# Patient Record
Sex: FEMALE | Race: OTHER | HISPANIC OR LATINO
[De-identification: names, ages, dates, MRNs, and addresses within clinical notes are randomized per-mention and may not be internally consistent; named-entity substitution may affect disease eponyms.]

---

## 2022-08-01 PROBLEM — Z00.00 ENCOUNTER FOR PREVENTIVE HEALTH EXAMINATION: Status: ACTIVE | Noted: 2022-08-01

## 2022-08-26 PROBLEM — N18.5 ANEMIA DUE TO STAGE 5 CHRONIC KIDNEY DISEASE, NOT ON CHRONIC DIALYSIS: Status: RESOLVED | Noted: 2022-08-26 | Resolved: 2022-08-26

## 2022-08-29 ENCOUNTER — APPOINTMENT (OUTPATIENT)
Dept: CARDIOLOGY | Facility: CLINIC | Age: 77
End: 2022-08-29

## 2022-08-29 ENCOUNTER — NON-APPOINTMENT (OUTPATIENT)
Age: 77
End: 2022-08-29

## 2022-08-29 VITALS
TEMPERATURE: 98.3 F | HEART RATE: 81 BPM | DIASTOLIC BLOOD PRESSURE: 66 MMHG | SYSTOLIC BLOOD PRESSURE: 138 MMHG | RESPIRATION RATE: 16 BRPM | HEIGHT: 59 IN | BODY MASS INDEX: 25.8 KG/M2 | WEIGHT: 128 LBS | OXYGEN SATURATION: 100 %

## 2022-08-29 DIAGNOSIS — N18.5 CHRONIC KIDNEY DISEASE, STAGE 5: ICD-10-CM

## 2022-08-29 DIAGNOSIS — Z82.49 FAMILY HISTORY OF ISCHEMIC HEART DISEASE AND OTHER DISEASES OF THE CIRCULATORY SYSTEM: ICD-10-CM

## 2022-08-29 DIAGNOSIS — Z80.9 FAMILY HISTORY OF MALIGNANT NEOPLASM, UNSPECIFIED: ICD-10-CM

## 2022-08-29 DIAGNOSIS — Z78.9 OTHER SPECIFIED HEALTH STATUS: ICD-10-CM

## 2022-08-29 DIAGNOSIS — M19.90 UNSPECIFIED OSTEOARTHRITIS, UNSPECIFIED SITE: ICD-10-CM

## 2022-08-29 DIAGNOSIS — D63.1 CHRONIC KIDNEY DISEASE, STAGE 5: ICD-10-CM

## 2022-08-29 DIAGNOSIS — Z83.3 FAMILY HISTORY OF DIABETES MELLITUS: ICD-10-CM

## 2022-08-29 PROCEDURE — 93000 ELECTROCARDIOGRAM COMPLETE: CPT

## 2022-08-29 PROCEDURE — 99204 OFFICE O/P NEW MOD 45 MIN: CPT | Mod: 25
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
## 2022-08-29 NOTE — REVIEW OF SYSTEMS
[Feeling Fatigued] : feeling fatigued [Dyspnea on exertion] : dyspnea during exertion [Cough] : cough [Negative] : Heme/Lymph [Fever] : no fever [Chills] : no chills [SOB] : no shortness of breath [Chest Discomfort] : no chest discomfort [Lower Ext Edema] : no extremity edema [Leg Claudication] : no intermittent leg claudication [Palpitations] : no palpitations [Orthopnea] : no orthopnea [PND] : no PND [Syncope] : no syncope [Wheezing] : no wheezing [Coughing Up Blood] : no hemoptysis

## 2022-08-29 NOTE — PHYSICAL EXAM
[Well Developed] : well developed [Well Nourished] : well nourished [No Acute Distress] : no acute distress [Normal Conjunctiva] : normal conjunctiva [Normal Venous Pressure] : normal venous pressure [No Carotid Bruit] : no carotid bruit [Normal S1, S2] : normal S1, S2 [No Rub] : no rub [No Gallop] : no gallop [Clear Lung Fields] : clear lung fields [Good Air Entry] : good air entry [No Respiratory Distress] : no respiratory distress  [Soft] : abdomen soft [Non Tender] : non-tender [No Masses/organomegaly] : no masses/organomegaly [Normal Bowel Sounds] : normal bowel sounds [Normal Gait] : normal gait [No Edema] : no edema [No Cyanosis] : no cyanosis [No Clubbing] : no clubbing [No Varicosities] : no varicosities [No Rash] : no rash [No Skin Lesions] : no skin lesions [Moves all extremities] : moves all extremities [No Focal Deficits] : no focal deficits [Normal Speech] : normal speech [Alert and Oriented] : alert and oriented [Normal memory] : normal memory [de-identified] : soft systolic murmur at LUSB

## 2022-08-30 LAB
ALBUMIN SERPL ELPH-MCNC: 4.2 G/DL
ALP BLD-CCNC: 138 U/L
ALT SERPL-CCNC: 28 U/L
ANION GAP SERPL CALC-SCNC: 13 MMOL/L
AST SERPL-CCNC: 35 U/L
BASOPHILS # BLD AUTO: 0.05 K/UL
BASOPHILS NFR BLD AUTO: 1.3 %
BILIRUB SERPL-MCNC: 0.2 MG/DL
BUN SERPL-MCNC: 20 MG/DL
CALCIUM SERPL-MCNC: 9.7 MG/DL
CHLORIDE SERPL-SCNC: 93 MMOL/L
CHOLEST SERPL-MCNC: 155 MG/DL
CO2 SERPL-SCNC: 23 MMOL/L
CREAT SERPL-MCNC: 1.04 MG/DL
EGFR: 55 ML/MIN/1.73M2
EOSINOPHIL # BLD AUTO: 0.11 K/UL
EOSINOPHIL NFR BLD AUTO: 2.8 %
ESTIMATED AVERAGE GLUCOSE: 100 MG/DL
GLUCOSE SERPL-MCNC: 57 MG/DL
HBA1C MFR BLD HPLC: 5.1 %
HCT VFR BLD CALC: 27.6 %
HDLC SERPL-MCNC: 50 MG/DL
HGB BLD-MCNC: 8.6 G/DL
IMM GRANULOCYTES NFR BLD AUTO: 0.3 %
LDLC SERPL CALC-MCNC: 83 MG/DL
LYMPHOCYTES # BLD AUTO: 1 K/UL
LYMPHOCYTES NFR BLD AUTO: 25.1 %
MAN DIFF?: NORMAL
MCHC RBC-ENTMCNC: 29.6 PG
MCHC RBC-ENTMCNC: 31.2 GM/DL
MCV RBC AUTO: 94.8 FL
MONOCYTES # BLD AUTO: 0.39 K/UL
MONOCYTES NFR BLD AUTO: 9.8 %
NEUTROPHILS # BLD AUTO: 2.42 K/UL
NEUTROPHILS NFR BLD AUTO: 60.7 %
NONHDLC SERPL-MCNC: 105 MG/DL
PLATELET # BLD AUTO: 273 K/UL
POTASSIUM SERPL-SCNC: 3.9 MMOL/L
PROT SERPL-MCNC: 8.1 G/DL
RBC # BLD: 2.91 M/UL
RBC # FLD: 13.4 %
SODIUM SERPL-SCNC: 130 MMOL/L
TRIGL SERPL-MCNC: 109 MG/DL
TSH SERPL-ACNC: 3.18 UIU/ML
WBC # FLD AUTO: 3.98 K/UL

## 2022-12-29 ENCOUNTER — APPOINTMENT (OUTPATIENT)
Dept: RHEUMATOLOGY | Facility: CLINIC | Age: 77
End: 2022-12-29
Payer: MEDICARE

## 2022-12-29 ENCOUNTER — LABORATORY RESULT (OUTPATIENT)
Age: 77
End: 2022-12-29

## 2022-12-29 ENCOUNTER — RESULT REVIEW (OUTPATIENT)
Age: 77
End: 2022-12-29

## 2022-12-29 VITALS
OXYGEN SATURATION: 96 % | SYSTOLIC BLOOD PRESSURE: 148 MMHG | HEART RATE: 82 BPM | HEIGHT: 59 IN | DIASTOLIC BLOOD PRESSURE: 72 MMHG | WEIGHT: 121 LBS | TEMPERATURE: 97.9 F | BODY MASS INDEX: 24.39 KG/M2

## 2022-12-29 PROCEDURE — 99205 OFFICE O/P NEW HI 60 MIN: CPT

## 2022-12-29 PROCEDURE — 99215 OFFICE O/P EST HI 40 MIN: CPT

## 2023-01-02 NOTE — REVIEW OF SYSTEMS
[As Noted in HPI] : as noted in HPI [Arthralgias] : arthralgias [Joint Swelling] : joint swelling [Joint Stiffness] : joint stiffness [Negative] : Heme/Lymph

## 2023-01-02 NOTE — HISTORY OF PRESENT ILLNESS
[Currently Experiencing] : currently [Arthralgias] : arthralgias [FreeTextEntry1] : patient with a hx of RA for many years - with ongoing deformities over the hands.  reports mild pain - minimal stiffness and swelling \par Diagnosed with  giant cell arteritis in 2019 with loss of vision  over the right eye - was on prednisone for 3 years, now tapered off over 3 months with no return of her symptoms. \par \par was on tocilizumab but had a heart attack in 02/2021 - and stopped - unsure if related - now with heart failure\par vision is decreased - has not seen ophto - in over 2 years.\par ongoing anemia - last CBC in 08/2022 at 8.6 - no iron levels\par has gastro appt tomorrow\par \par has ongoing bone pain - has stiffness in the morning -

## 2023-01-02 NOTE — ASSESSMENT
[FreeTextEntry1] : \par \par 1. RA for many years with multiple hand deformities - ongoing anemia of chronic disease - check labs today - previously on tocilizumab send for x-rays of the hands - check full rheum serologies\par 2. GCA - had been on prednisone for 3 years - has stopped prednisone for about 3 months with no return of her symptoms.  has decreased vision but has not seen optho - referral to ophto\par 3. CAD with CHF\par 4. osteoporosis - send for dexa - has lost 4 inches send for x-rays ro fracture\par 5. knee pain - send for x-rays\par \par patient to call in 1 week to discuss results and next steps\par \par \par f/u 6 weeks\par \par \par

## 2023-01-02 NOTE — PHYSICAL EXAM
[General Appearance - Alert] : alert [General Appearance - In No Acute Distress] : in no acute distress [Neck Appearance] : the appearance of the neck was normal [Neck Cervical Mass (___cm)] : no neck mass was observed [Jugular Venous Distention Increased] : there was no jugular-venous distention [Thyroid Diffuse Enlargement] : the thyroid was not enlarged [Thyroid Nodule] : there were no palpable thyroid nodules [Auscultation Breath Sounds / Voice Sounds] : lungs were clear to auscultation bilaterally [Heart Rate And Rhythm] : heart rate was normal and rhythm regular [Heart Sounds] : normal S1 and S2 [Heart Sounds Gallop] : no gallops [Murmurs] : no murmurs [Heart Sounds Pericardial Friction Rub] : no pericardial rub [Full Pulse] : the pedal pulses are present [Edema] : there was no peripheral edema [Abdomen Soft] : soft [Bowel Sounds] : normal bowel sounds [Abdomen Tenderness] : non-tender [Abdomen Mass (___ Cm)] : no abdominal mass palpated [Cervical Lymph Nodes Enlarged Posterior Bilaterally] : posterior cervical [Cervical Lymph Nodes Enlarged Anterior Bilaterally] : anterior cervical [Supraclavicular Lymph Nodes Enlarged Bilaterally] : supraclavicular [No CVA Tenderness] : no ~M costovertebral angle tenderness [No Spinal Tenderness] : no spinal tenderness [FreeTextEntry1] : ra changes over the hands,with ulnar deviation and swan neck deformities - shoulder with decreased ROM and  synovitis over the shoulder - all other joints with full ROM [Skin Color & Pigmentation] : normal skin color and pigmentation [Skin Turgor] : normal skin turgor [] : no rash [Oriented To Time, Place, And Person] : oriented to person, place, and time [Impaired Insight] : insight and judgment were intact [Affect] : the affect was normal

## 2023-01-09 ENCOUNTER — NON-APPOINTMENT (OUTPATIENT)
Age: 78
End: 2023-01-09

## 2023-01-09 ENCOUNTER — APPOINTMENT (OUTPATIENT)
Dept: CARDIOLOGY | Facility: CLINIC | Age: 78
End: 2023-01-09
Payer: MEDICARE

## 2023-01-09 ENCOUNTER — APPOINTMENT (OUTPATIENT)
Dept: CARDIOLOGY | Facility: CLINIC | Age: 78
End: 2023-01-09

## 2023-01-09 VITALS
WEIGHT: 125 LBS | DIASTOLIC BLOOD PRESSURE: 81 MMHG | OXYGEN SATURATION: 98 % | HEIGHT: 59 IN | SYSTOLIC BLOOD PRESSURE: 155 MMHG | BODY MASS INDEX: 25.2 KG/M2 | HEART RATE: 83 BPM

## 2023-01-09 PROCEDURE — 99214 OFFICE O/P EST MOD 30 MIN: CPT | Mod: 25

## 2023-01-09 PROCEDURE — 93000 ELECTROCARDIOGRAM COMPLETE: CPT

## 2023-01-09 NOTE — PHYSICAL EXAM
[Well Developed] : well developed [Well Nourished] : well nourished [No Acute Distress] : no acute distress [Normal Conjunctiva] : normal conjunctiva [Normal Venous Pressure] : normal venous pressure [No Carotid Bruit] : no carotid bruit [Normal S1, S2] : normal S1, S2 [No Rub] : no rub [No Gallop] : no gallop [Clear Lung Fields] : clear lung fields [Good Air Entry] : good air entry [No Respiratory Distress] : no respiratory distress  [Soft] : abdomen soft [Non Tender] : non-tender [No Masses/organomegaly] : no masses/organomegaly [Normal Bowel Sounds] : normal bowel sounds [Normal Gait] : normal gait [No Edema] : no edema [No Cyanosis] : no cyanosis [No Clubbing] : no clubbing [No Varicosities] : no varicosities [No Rash] : no rash [No Skin Lesions] : no skin lesions [Moves all extremities] : moves all extremities [No Focal Deficits] : no focal deficits [Normal Speech] : normal speech [Alert and Oriented] : alert and oriented [Normal memory] : normal memory [de-identified] : soft systolic murmur at LUSB
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
## 2023-01-12 LAB
ALBUMIN MFR SERPL ELPH: 52.4 %
ALBUMIN SERPL ELPH-MCNC: 4.6 G/DL
ALBUMIN SERPL-MCNC: 4 G/DL
ALBUMIN/GLOB SERPL: 1.1 RATIO
ALP BLD-CCNC: 112 U/L
ALPHA1 GLOB MFR SERPL ELPH: 4.8 %
ALPHA1 GLOB SERPL ELPH-MCNC: 0.4 G/DL
ALPHA2 GLOB MFR SERPL ELPH: 11.7 %
ALPHA2 GLOB SERPL ELPH-MCNC: 0.9 G/DL
ALT SERPL-CCNC: 34 U/L
ANION GAP SERPL CALC-SCNC: 13 MMOL/L
APPEARANCE: CLEAR
AST SERPL-CCNC: 35 U/L
B-GLOBULIN MFR SERPL ELPH: 11.6 %
B-GLOBULIN SERPL ELPH-MCNC: 0.9 G/DL
BASOPHILS # BLD AUTO: 0.04 K/UL
BASOPHILS NFR BLD AUTO: 1 %
BILIRUB SERPL-MCNC: 0.4 MG/DL
BILIRUBIN URINE: NEGATIVE
BLOOD URINE: ABNORMAL
BUN SERPL-MCNC: 24 MG/DL
CALCIUM SERPL-MCNC: 9.7 MG/DL
CCP AB SER IA-ACNC: <8 UNITS
CHLORIDE SERPL-SCNC: 96 MMOL/L
CO2 SERPL-SCNC: 25 MMOL/L
COLOR: NORMAL
CREAT SERPL-MCNC: 1.11 MG/DL
CRP SERPL-MCNC: <3 MG/L
DEPRECATED KAPPA LC FREE/LAMBDA SER: 2.17 RATIO
DSDNA AB SER-ACNC: <12 IU/ML
EGFR: 51 ML/MIN/1.73M2
ENA RNP AB SER IA-ACNC: 0.5 AL
ENA SCL70 IGG SER IA-ACNC: <0.2 AL
ENA SM AB SER IA-ACNC: <0.2 AL
ENA SS-A AB SER IA-ACNC: <0.2 AL
ENA SS-B AB SER IA-ACNC: 0.2 AL
EOSINOPHIL # BLD AUTO: 0.13 K/UL
EOSINOPHIL NFR BLD AUTO: 3.2 %
ERYTHROCYTE [SEDIMENTATION RATE] IN BLOOD BY WESTERGREN METHOD: 76 MM/HR
FOLATE SERPL-MCNC: 15.8 NG/ML
GAMMA GLOB FLD ELPH-MCNC: 1.5 G/DL
GAMMA GLOB MFR SERPL ELPH: 19.5 %
GLUCOSE QUALITATIVE U: NEGATIVE
GLUCOSE SERPL-MCNC: 89 MG/DL
HBV CORE IGG+IGM SER QL: NONREACTIVE
HBV SURFACE AB SER QL: NONREACTIVE
HBV SURFACE AG SER QL: NONREACTIVE
HCT VFR BLD CALC: 28.5 %
HCV AB SER QL: NONREACTIVE
HCV S/CO RATIO: 0.1 S/CO
HGB BLD-MCNC: 9.4 G/DL
IGA SER QL IEP: 249 MG/DL
IGG SER QL IEP: 1593 MG/DL
IGM SER QL IEP: 18 MG/DL
IMM GRANULOCYTES NFR BLD AUTO: 0.2 %
INTERPRETATION SERPL IEP-IMP: NORMAL
IRON SATN MFR SERPL: 17 %
IRON SERPL-MCNC: 58 UG/DL
KAPPA LC CSF-MCNC: 1.98 MG/DL
KAPPA LC SERPL-MCNC: 4.3 MG/DL
KETONES URINE: NEGATIVE
LEUKOCYTE ESTERASE URINE: NEGATIVE
LYMPHOCYTES # BLD AUTO: 1.17 K/UL
LYMPHOCYTES NFR BLD AUTO: 29 %
M PROTEIN SPEC IFE-MCNC: NORMAL
M TB IFN-G BLD-IMP: NEGATIVE
MAN DIFF?: NORMAL
MCHC RBC-ENTMCNC: 29.3 PG
MCHC RBC-ENTMCNC: 33 GM/DL
MCV RBC AUTO: 88.8 FL
MONOCYTES # BLD AUTO: 0.45 K/UL
MONOCYTES NFR BLD AUTO: 11.2 %
NEUTROPHILS # BLD AUTO: 2.23 K/UL
NEUTROPHILS NFR BLD AUTO: 55.4 %
NITRITE URINE: NEGATIVE
PH URINE: 7
PLATELET # BLD AUTO: 247 K/UL
POTASSIUM SERPL-SCNC: 4.8 MMOL/L
PROT SERPL-MCNC: 7.5 G/DL
PROT SERPL-MCNC: 7.7 G/DL
PROT SERPL-MCNC: 7.7 G/DL
PROTEIN URINE: NORMAL
QUANTIFERON TB PLUS MITOGEN MINUS NIL: 5.89 IU/ML
QUANTIFERON TB PLUS NIL: 0.02 IU/ML
QUANTIFERON TB PLUS TB1 MINUS NIL: 0 IU/ML
QUANTIFERON TB PLUS TB2 MINUS NIL: 0 IU/ML
RBC # BLD: 3.21 M/UL
RBC # FLD: 14.8 %
RF+CCP IGG SER-IMP: NEGATIVE
RHEUMATOID FACT SER QL: <10 IU/ML
SODIUM SERPL-SCNC: 133 MMOL/L
SPECIFIC GRAVITY URINE: 1.02
TIBC SERPL-MCNC: 343 UG/DL
TSH SERPL-ACNC: 3.41 UIU/ML
UIBC SERPL-MCNC: 285 UG/DL
UROBILINOGEN URINE: NORMAL
VIT B12 SERPL-MCNC: 507 PG/ML
WBC # FLD AUTO: 4.03 K/UL

## 2023-01-16 ENCOUNTER — APPOINTMENT (OUTPATIENT)
Dept: RADIOLOGY | Facility: CLINIC | Age: 78
End: 2023-01-16
Payer: MEDICARE

## 2023-01-16 ENCOUNTER — OUTPATIENT (OUTPATIENT)
Dept: OUTPATIENT SERVICES | Facility: HOSPITAL | Age: 78
LOS: 1 days | End: 2023-01-16
Payer: COMMERCIAL

## 2023-01-16 ENCOUNTER — RESULT REVIEW (OUTPATIENT)
Age: 78
End: 2023-01-16

## 2023-01-16 DIAGNOSIS — M17.0 BILATERAL PRIMARY OSTEOARTHRITIS OF KNEE: ICD-10-CM

## 2023-01-16 PROCEDURE — 73564 X-RAY EXAM KNEE 4 OR MORE: CPT

## 2023-01-16 PROCEDURE — 72074 X-RAY EXAM THORAC SPINE4/>VW: CPT | Mod: 26

## 2023-01-16 PROCEDURE — 73564 X-RAY EXAM KNEE 4 OR MORE: CPT | Mod: 26,LT,RT

## 2023-01-16 PROCEDURE — 72110 X-RAY EXAM L-2 SPINE 4/>VWS: CPT | Mod: 26

## 2023-01-16 PROCEDURE — 72074 X-RAY EXAM THORAC SPINE4/>VW: CPT

## 2023-01-16 PROCEDURE — 72110 X-RAY EXAM L-2 SPINE 4/>VWS: CPT

## 2023-01-17 ENCOUNTER — OUTPATIENT (OUTPATIENT)
Dept: OUTPATIENT SERVICES | Facility: HOSPITAL | Age: 78
LOS: 1 days | End: 2023-01-17
Payer: MEDICARE

## 2023-01-17 ENCOUNTER — APPOINTMENT (OUTPATIENT)
Dept: CV DIAGNOSITCS | Facility: HOSPITAL | Age: 78
End: 2023-01-17

## 2023-01-17 DIAGNOSIS — I50.9 HEART FAILURE, UNSPECIFIED: ICD-10-CM

## 2023-01-17 PROCEDURE — 93306 TTE W/DOPPLER COMPLETE: CPT | Mod: 26

## 2023-01-27 ENCOUNTER — APPOINTMENT (OUTPATIENT)
Dept: RADIOLOGY | Facility: CLINIC | Age: 78
End: 2023-01-27
Payer: MEDICARE

## 2023-01-27 PROCEDURE — 77080 DXA BONE DENSITY AXIAL: CPT

## 2023-01-30 ENCOUNTER — APPOINTMENT (OUTPATIENT)
Dept: CARDIOTHORACIC SURGERY | Facility: CLINIC | Age: 78
End: 2023-01-30
Payer: MEDICARE

## 2023-01-30 VITALS
SYSTOLIC BLOOD PRESSURE: 143 MMHG | HEART RATE: 77 BPM | TEMPERATURE: 98.2 F | OXYGEN SATURATION: 99 % | DIASTOLIC BLOOD PRESSURE: 80 MMHG

## 2023-01-30 DIAGNOSIS — Z20.822 CONTACT WITH AND (SUSPECTED) EXPOSURE TO COVID-19: ICD-10-CM

## 2023-01-30 PROCEDURE — 99205 OFFICE O/P NEW HI 60 MIN: CPT
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
## 2023-01-31 NOTE — PHYSICAL EXAM
[Normal Rate] : normal [Rhythm Regular] : regular [Normal S1] : normal S1 [Normal S2] : normal S2 [2+] : left 2+ [Normal] : alert and oriented, normal memory [III] : a grade 3 [___ +] : bilateral [unfilled]U+ pretibial pitting edema [No Cyanosis] : no cyanosis [No Clubbing] : no clubbing [No Rash] : no rash [No Skin Lesions] : no skin lesions

## 2023-01-31 NOTE — REASON FOR VISIT
[Structural Heart and Valve Disease] : structural heart and valve disease [Family Member] : family member [FreeTextEntry3] : Dr. Manriquez

## 2023-01-31 NOTE — DISCUSSION/SUMMARY
[FreeTextEntry1] : Ms Jones has severe secondary MR with 2 HF admissions and a progressive decline in LV function since her MI in 2021; she also has severe TR and severe PHTN. Her MV leaflet anatomy does appear amenable to MV RADHA. As such, we discussed the risks and benefits of the procedure, and they would like to pursue such intervention--to whatever extent it may alleviate her symptoms, improve her functional capacity and QOL, and mitigate her risk for HF progression. That said, she does not meet the patient profile or criteria that were included in the COAPT Trial (for patients with symptomatic severe secondary MR), so those results may not necessarily be extrapolated to her case with respect to post MV RADHA expectations, which we also discussed (given her degree of biventricular dysfunction, PHTN, and concomitant severe TR). With respect to her TR, as we also discussed in detail, this would be re-evaluated after treating her MR--as per all of the TR trial protocols, repeat imaging (TTE and ISABELLA) must be performed no sooner than 60 days following a transcatheter MV intervention for re-assessment of TR severity. Additionally, all patients being evaluated for the TR trials must be under the care of a CHF team to guide GDMT optimization. As such, we have agreed to the following plan:\par \par 1.  R+L catheterization, with consideration for PCI if deemed appropriate.\par \par 2.  HF consult with Dr Garcia at [facility]. If there are no contraindications, an SGLT2i should be considered, as it is now recommended GDMT for patients with any type of HF that are being evaluated for any of the Structural Heart trials.\par \par 3.  Follow up with our team after the above is completed to discuss next steps. I anticipate we will proceed with MV RADHA once she is deemed medically optimized. As noted above, her TR would be re-evaluated and, if deemed appropriate, considered for intervention at a later date.

## 2023-02-02 ENCOUNTER — NON-APPOINTMENT (OUTPATIENT)
Age: 78
End: 2023-02-02

## 2023-02-02 ENCOUNTER — APPOINTMENT (OUTPATIENT)
Dept: CARDIOLOGY | Facility: CLINIC | Age: 78
End: 2023-02-02
Payer: MEDICARE

## 2023-02-02 VITALS
DIASTOLIC BLOOD PRESSURE: 69 MMHG | HEART RATE: 84 BPM | WEIGHT: 125 LBS | HEIGHT: 59 IN | SYSTOLIC BLOOD PRESSURE: 147 MMHG | BODY MASS INDEX: 25.2 KG/M2 | OXYGEN SATURATION: 99 %

## 2023-02-02 DIAGNOSIS — Z82.3 FAMILY HISTORY OF STROKE: ICD-10-CM

## 2023-02-02 DIAGNOSIS — R06.02 SHORTNESS OF BREATH: ICD-10-CM

## 2023-02-02 DIAGNOSIS — H54.7 UNSPECIFIED VISUAL LOSS: ICD-10-CM

## 2023-02-02 PROCEDURE — 36415 COLL VENOUS BLD VENIPUNCTURE: CPT

## 2023-02-02 PROCEDURE — 93000 ELECTROCARDIOGRAM COMPLETE: CPT

## 2023-02-02 PROCEDURE — 99205 OFFICE O/P NEW HI 60 MIN: CPT | Mod: 25

## 2023-02-03 ENCOUNTER — NON-APPOINTMENT (OUTPATIENT)
Age: 78
End: 2023-02-03

## 2023-02-03 LAB
ALBUMIN SERPL ELPH-MCNC: 4.6 G/DL
ALP BLD-CCNC: 107 U/L
ALT SERPL-CCNC: 45 U/L
ANION GAP SERPL CALC-SCNC: 11 MMOL/L
AST SERPL-CCNC: 41 U/L
BASOPHILS # BLD AUTO: 0.06 K/UL
BASOPHILS NFR BLD AUTO: 1.4 %
BILIRUB SERPL-MCNC: 0.4 MG/DL
BUN SERPL-MCNC: 19 MG/DL
CALCIUM SERPL-MCNC: 10 MG/DL
CHLORIDE SERPL-SCNC: 99 MMOL/L
CHOLEST SERPL-MCNC: 151 MG/DL
CO2 SERPL-SCNC: 25 MMOL/L
CREAT SERPL-MCNC: 0.91 MG/DL
EGFR: 64 ML/MIN/1.73M2
EOSINOPHIL # BLD AUTO: 0.1 K/UL
EOSINOPHIL NFR BLD AUTO: 2.4 %
ESTIMATED AVERAGE GLUCOSE: 108 MG/DL
HBA1C MFR BLD HPLC: 5.4 %
HCT VFR BLD CALC: 30 %
HDLC SERPL-MCNC: 59 MG/DL
HGB BLD-MCNC: 9.4 G/DL
IMM GRANULOCYTES NFR BLD AUTO: 0.2 %
LDLC SERPL CALC-MCNC: 82 MG/DL
LYMPHOCYTES # BLD AUTO: 1.38 K/UL
LYMPHOCYTES NFR BLD AUTO: 32.9 %
MAGNESIUM SERPL-MCNC: 2.1 MG/DL
MAN DIFF?: NORMAL
MCHC RBC-ENTMCNC: 28.8 PG
MCHC RBC-ENTMCNC: 31.3 GM/DL
MCV RBC AUTO: 92 FL
MONOCYTES # BLD AUTO: 0.37 K/UL
MONOCYTES NFR BLD AUTO: 8.8 %
NEUTROPHILS # BLD AUTO: 2.27 K/UL
NEUTROPHILS NFR BLD AUTO: 54.3 %
NONHDLC SERPL-MCNC: 92 MG/DL
NT-PROBNP SERPL-MCNC: 2467 PG/ML
PLATELET # BLD AUTO: 243 K/UL
POTASSIUM SERPL-SCNC: 4.7 MMOL/L
PROT SERPL-MCNC: 8.4 G/DL
RBC # BLD: 3.26 M/UL
RBC # FLD: 14.6 %
SODIUM SERPL-SCNC: 135 MMOL/L
TRIGL SERPL-MCNC: 50 MG/DL
TSH SERPL-ACNC: 2.92 UIU/ML
URATE SERPL-MCNC: 5.1 MG/DL
WBC # FLD AUTO: 4.19 K/UL

## 2023-02-03 NOTE — ASSESSMENT
[FreeTextEntry1] : 77 year old female with past medical history of HTN, HLD, ischemic cardiomyopathy/chronic  systolic HFrEF ( LVIDD 29% 1/2023), rheumatoid arthritis, and giant cell arteritis ( loss of vision 2019 left eye),  MI in 2021 for which she had an angiogram at [facility] that demonstrated significant CAD ( as per family record)  but was not revascularized , felt not amenable to PCI . Her LVEF was reportedly 40-45% at that time with TTE 1/17/23 LVEF 29%, severe MR, mod severe TR, severe pHTN.\par ACC/AHA Stage C, NYHA Class III symptoms\par Appears mildly hypervolemic and hypertensive with B/P 147/69

## 2023-02-03 NOTE — DISCUSSION/SUMMARY
[___ Week(s)] : in [unfilled] week(s) [EKG obtained to assist in diagnosis and management of assessed problem(s)] : EKG obtained to assist in diagnosis and management of assessed problem(s) [FreeTextEntry1] : 1. Ischemic cardiomyopathy/chronic  systolic HFrEF ( LVIDD 29% 1/2023),\par - mildly hypervolemic today on exam\par - with segmental LV systolic dysfunction with Severe MR, TR and severe pHTN\par - take furosemide 40 mg for 3 days and then increase furosemide to 20 mg daily with additional as needed for weight gain of 2-3 lbs in 2-3 days \par - increase Entresto to 49-51 mg bid\par - start Farxiga 5 mg daily. Pt given a coupon for free 30 days. States she took Jardiance in the past and didn't feel well so will stop if she is having any adverse symptoms. \par - limit salt to less than 2000 mg per day\par - limit fluid to less than 2 liters per day\par - daily weight and B/P log with goal weight 120 lbs and is 122-1224 lbs at home. \par - will continue optimization of GDMT in preparation for intervention on Mitral valve-  MV RADHA\par \par 2. CAD/MI ( 2021) - no active chest pain or acute EKG changes\par - not revascularized \par - scheduled for RHC/LHC on 2/10/23 \par - continue ASA 81 mg daily\par - continue atorvastatin 40 mg qhs\par \par 3. HTN\par - increase Entresto as above\par - metoprolol as above\par \par 4. Giant cell arteritis/ RA\par - follow up with rheumatolgy\par \par Follow up in the office in 3 weeks for further medication optimization in preparation for  MV RADHA wit structural heart team

## 2023-02-03 NOTE — CARDIOLOGY SUMMARY
DATE OF SURGERY:  02/08/2019  SURGEON:  Justo Ryan MD    PREOPERATIVE DIAGNOSIS:  Abscess, left breast.    POSTOPERATIVE DIAGNOSIS:  Abscess, left breast.    PROCEDURE:  Incision and drainage of abscess, left breast.    ANESTHESIA:  General.    DESCRIPTION OF PROCEDURE:  The patient was taken to the operating room,  placed on the operating table, inducted with anesthesia.  Sterile prep of the  left breast was done.  The patient had abscess drained several months ago and  there was an old incision at the 1 o'clock position with some deformity of  the areolar margin, which was well healed.  Most of the fluctuance, however,  was on the contralateral side, at approximately 5 o'clock position.  Therefore, aspiration was performed 1st to confirm presence of the abscess  cavity and location.  Once this was done, an approximately 1.5 cm stab wound  incision was made transversely right at the margin of the areolar margin at 5  o'clock position.  All purulent material was drained and cultured.  It was  irrigated out with saline and since this was a small incision and used to  drain the large cavity.  A Penrose drain was actually placed into the cavity  and secured with some 4-0 Prolene suture x2 to hold in place rather than  packing.  Sterile dressings were applied.  Patient tolerated the procedure  well.  The patient already had a breast biopsy done on her last admission  with the abscess cavity, which was not malignant.  The patient does have a  history of hidradenitis in bilateral axilla and breasts.  This was possibly  secondary to this condition as well.  The patient tolerated the procedure  well.             MD MARIALUISA Altman/[place]     DD:  02/08/2019 12:55:16 / DT:  02/08/2019 23:18:35     Job #:  [id number]    [de-identified] : TTE 1/17/23 LVEF 29%, severe MR, mod severe TR, severe pHTN  [de-identified] : 2/2/23 NSR 84   with NSST with PVC's [de-identified] : MI in 2021 for which she had an angiogram at [facility] that demonstrated significant CAD (I do not have a report or imaging for review) but was felt not amenable to PC

## 2023-02-03 NOTE — PHYSICAL EXAM
[Well Nourished] : well nourished [No Acute Distress] : no acute distress [Normal S1, S2] : normal S1, S2 [Clear Lung Fields] : clear lung fields [Soft] : abdomen soft [Non Tender] : non-tender [No Rash] : no rash [Normal] : alert and oriented, normal memory [de-identified] : decreased vision [de-identified] : no carotid bruits appreciated [de-identified] : JVP 10 cm with HJR [de-identified] : RRR  3/6 SM at apex  [de-identified] : decreased at right base [de-identified] : liver tip not palpable [de-identified] : slow gait [de-identified] : 1+ edema bilaterally

## 2023-02-03 NOTE — ADDENDUM
[FreeTextEntry1] : Labs reviewed and will call daughter Teresa with results notable for normal K and renal function and serum pro BNP 2467.

## 2023-02-03 NOTE — HISTORY OF PRESENT ILLNESS
[FreeTextEntry1] : Ms Teresa Jones is a 77 year old female with past medical history of HTN, HLD, ischemic cardiomyopathy/chronic  systolic HFrEF ( LVIDD 29% 1/2023), rheumatoid arthritis, and giant cell arteritis ( loss of vision 2019 left eye),  MI in 2021 for which she had an angiogram at [facility] that demonstrated significant CAD ( as per family record)  but was not revascularized , felt not amenable to PCI . Her LVEF was reportedly 40-45% at that time with TTE 1/17/23 LVEF 29%, severe MR, mod severe TR, severe pHTN  Pt is her for an initial HF consultation and  evaluation of mitral valve regurgitation Referred by Dr. Manriquez.. \par \par HPI: As per family records ( see  scanned data) Admitted at [facility] with MI and transferred to [facility] but was unable to have stent. \par \par Pt is accompanied by her grand daughter for appointment. Pt bought her meds in and is taking HF meds including; Entresto 24-26 mg bid, metoprolol 25 mg daily and takes furosemide 20 mg approx twice per week if she sees swelling in her legs. \par \par Home weight range 122-124 lbs and recent B/P range on echo and in office today 147/69. States she is a non smoker, no ETOH, states son has had CABG. She states she has generalized aches but is able to walk room to room but has shortness of breath if walking further, < 1 block. She is also limited by pain in her knees. She can climb 1 flight os stairs slowly. SHe sleeps with 2 pillows on her side ( on couch)  and states she has snored for many years but denies orthopnea/PND. She gets up a night to go to the bathroom.  Admits to dry cough during the day and night. Admits to diet indiscretions including eating sandwiches with cold cuts.\par \par She denies chest pain, palpitations, dizziness/LH and syncope. No falls. Pt does not have an ICD. \par \par \par \par \par

## 2023-02-10 ENCOUNTER — INPATIENT (INPATIENT)
Facility: HOSPITAL | Age: 78
LOS: 1 days | Discharge: HOME | DRG: 271 | End: 2023-02-12
Attending: INTERNAL MEDICINE | Admitting: INTERNAL MEDICINE
Payer: COMMERCIAL

## 2023-02-10 VITALS
SYSTOLIC BLOOD PRESSURE: 153 MMHG | TEMPERATURE: 98 F | HEIGHT: 58 IN | WEIGHT: 123.02 LBS | RESPIRATION RATE: 14 BRPM | HEART RATE: 73 BPM | OXYGEN SATURATION: 100 % | DIASTOLIC BLOOD PRESSURE: 72 MMHG

## 2023-02-10 DIAGNOSIS — I50.22 CHRONIC SYSTOLIC (CONGESTIVE) HEART FAILURE: ICD-10-CM

## 2023-02-10 LAB
ALBUMIN SERPL ELPH-MCNC: 4 G/DL — SIGNIFICANT CHANGE UP (ref 3.3–5)
ALP SERPL-CCNC: 105 U/L — SIGNIFICANT CHANGE UP (ref 40–120)
ALT FLD-CCNC: 33 U/L — SIGNIFICANT CHANGE UP (ref 10–45)
ANION GAP SERPL CALC-SCNC: 12 MMOL/L — SIGNIFICANT CHANGE UP (ref 5–17)
ANION GAP SERPL CALC-SCNC: 9 MMOL/L — SIGNIFICANT CHANGE UP (ref 5–17)
APTT BLD: 82.9 SEC — HIGH (ref 27.5–35.5)
AST SERPL-CCNC: 66 U/L — HIGH (ref 10–40)
BILIRUB SERPL-MCNC: 0.4 MG/DL — SIGNIFICANT CHANGE UP (ref 0.2–1.2)
BLD GP AB SCN SERPL QL: NEGATIVE — SIGNIFICANT CHANGE UP
BUN SERPL-MCNC: 21 MG/DL — SIGNIFICANT CHANGE UP (ref 7–23)
BUN SERPL-MCNC: 28 MG/DL — HIGH (ref 7–23)
CALCIUM SERPL-MCNC: 10.1 MG/DL — SIGNIFICANT CHANGE UP (ref 8.4–10.5)
CALCIUM SERPL-MCNC: 9.1 MG/DL — SIGNIFICANT CHANGE UP (ref 8.4–10.5)
CHLORIDE SERPL-SCNC: 102 MMOL/L — SIGNIFICANT CHANGE UP (ref 96–108)
CHLORIDE SERPL-SCNC: 102 MMOL/L — SIGNIFICANT CHANGE UP (ref 96–108)
CK MB BLD-MCNC: 8.9 % — HIGH (ref 0–3.5)
CK MB CFR SERPL CALC: 32.7 NG/ML — HIGH (ref 0–3.8)
CK SERPL-CCNC: 367 U/L — HIGH (ref 25–170)
CO2 SERPL-SCNC: 24 MMOL/L — SIGNIFICANT CHANGE UP (ref 22–31)
CO2 SERPL-SCNC: 25 MMOL/L — SIGNIFICANT CHANGE UP (ref 22–31)
CREAT SERPL-MCNC: 0.96 MG/DL — SIGNIFICANT CHANGE UP (ref 0.5–1.3)
CREAT SERPL-MCNC: 1.02 MG/DL — SIGNIFICANT CHANGE UP (ref 0.5–1.3)
EGFR: 56 ML/MIN/1.73M2 — LOW
EGFR: 60 ML/MIN/1.73M2 — SIGNIFICANT CHANGE UP
GLUCOSE BLDC GLUCOMTR-MCNC: 100 MG/DL — HIGH (ref 70–99)
GLUCOSE SERPL-MCNC: 101 MG/DL — HIGH (ref 70–99)
GLUCOSE SERPL-MCNC: 97 MG/DL — SIGNIFICANT CHANGE UP (ref 70–99)
HCT VFR BLD CALC: 29.6 % — LOW (ref 34.5–45)
HCT VFR BLD CALC: 31.3 % — LOW (ref 34.5–45)
HGB BLD-MCNC: 9.3 G/DL — LOW (ref 11.5–15.5)
HGB BLD-MCNC: 9.8 G/DL — LOW (ref 11.5–15.5)
INR BLD: 1.15 RATIO — SIGNIFICANT CHANGE UP (ref 0.88–1.16)
LACTATE BLDV-MCNC: 0.7 MMOL/L — SIGNIFICANT CHANGE UP (ref 0.5–2)
MAGNESIUM SERPL-MCNC: 2.1 MG/DL — SIGNIFICANT CHANGE UP (ref 1.6–2.6)
MCHC RBC-ENTMCNC: 28.5 PG — SIGNIFICANT CHANGE UP (ref 27–34)
MCHC RBC-ENTMCNC: 28.8 PG — SIGNIFICANT CHANGE UP (ref 27–34)
MCHC RBC-ENTMCNC: 31.3 GM/DL — LOW (ref 32–36)
MCHC RBC-ENTMCNC: 31.4 GM/DL — LOW (ref 32–36)
MCV RBC AUTO: 90.8 FL — SIGNIFICANT CHANGE UP (ref 80–100)
MCV RBC AUTO: 92.1 FL — SIGNIFICANT CHANGE UP (ref 80–100)
NRBC # BLD: 0 /100 WBCS — SIGNIFICANT CHANGE UP (ref 0–0)
NRBC # BLD: 0 /100 WBCS — SIGNIFICANT CHANGE UP (ref 0–0)
PHOSPHATE SERPL-MCNC: 3.4 MG/DL — SIGNIFICANT CHANGE UP (ref 2.5–4.5)
PLATELET # BLD AUTO: 202 K/UL — SIGNIFICANT CHANGE UP (ref 150–400)
PLATELET # BLD AUTO: 230 K/UL — SIGNIFICANT CHANGE UP (ref 150–400)
POTASSIUM SERPL-MCNC: 4 MMOL/L — SIGNIFICANT CHANGE UP (ref 3.5–5.3)
POTASSIUM SERPL-MCNC: 4.3 MMOL/L — SIGNIFICANT CHANGE UP (ref 3.5–5.3)
POTASSIUM SERPL-SCNC: 4 MMOL/L — SIGNIFICANT CHANGE UP (ref 3.5–5.3)
POTASSIUM SERPL-SCNC: 4.3 MMOL/L — SIGNIFICANT CHANGE UP (ref 3.5–5.3)
PROT SERPL-MCNC: 7.5 G/DL — SIGNIFICANT CHANGE UP (ref 6–8.3)
PROTHROM AB SERPL-ACNC: 13.3 SEC — SIGNIFICANT CHANGE UP (ref 10.5–13.4)
RBC # BLD: 3.26 M/UL — LOW (ref 3.8–5.2)
RBC # BLD: 3.4 M/UL — LOW (ref 3.8–5.2)
RBC # FLD: 14.4 % — SIGNIFICANT CHANGE UP (ref 10.3–14.5)
RBC # FLD: 14.6 % — HIGH (ref 10.3–14.5)
RH IG SCN BLD-IMP: POSITIVE — SIGNIFICANT CHANGE UP
SODIUM SERPL-SCNC: 136 MMOL/L — SIGNIFICANT CHANGE UP (ref 135–145)
SODIUM SERPL-SCNC: 138 MMOL/L — SIGNIFICANT CHANGE UP (ref 135–145)
TROPONIN T, HIGH SENSITIVITY RESULT: 1212 NG/L — HIGH (ref 0–51)
WBC # BLD: 4.61 K/UL — SIGNIFICANT CHANGE UP (ref 3.8–10.5)
WBC # BLD: 4.88 K/UL — SIGNIFICANT CHANGE UP (ref 3.8–10.5)
WBC # FLD AUTO: 4.61 K/UL — SIGNIFICANT CHANGE UP (ref 3.8–10.5)
WBC # FLD AUTO: 4.88 K/UL — SIGNIFICANT CHANGE UP (ref 3.8–10.5)

## 2023-02-10 PROCEDURE — 99292 CRITICAL CARE ADDL 30 MIN: CPT

## 2023-02-10 PROCEDURE — 92928 PRQ TCAT PLMT NTRAC ST 1 LES: CPT | Mod: LD

## 2023-02-10 PROCEDURE — 93456 R HRT CORONARY ARTERY ANGIO: CPT | Mod: 26,59

## 2023-02-10 PROCEDURE — 93010 ELECTROCARDIOGRAM REPORT: CPT

## 2023-02-10 PROCEDURE — 71045 X-RAY EXAM CHEST 1 VIEW: CPT | Mod: 26

## 2023-02-10 PROCEDURE — 99152 MOD SED SAME PHYS/QHP 5/>YRS: CPT

## 2023-02-10 PROCEDURE — 92921: CPT | Mod: LD

## 2023-02-10 PROCEDURE — 99222 1ST HOSP IP/OBS MODERATE 55: CPT

## 2023-02-10 PROCEDURE — 33967 INSERT I-AORT PERCUT DEVICE: CPT

## 2023-02-10 PROCEDURE — 99291 CRITICAL CARE FIRST HOUR: CPT

## 2023-02-10 RX ORDER — DEXTROSE 50 % IN WATER 50 %
25 SYRINGE (ML) INTRAVENOUS ONCE
Refills: 0 | Status: DISCONTINUED | OUTPATIENT
Start: 2023-02-10 | End: 2023-02-11

## 2023-02-10 RX ORDER — ASPIRIN/CALCIUM CARB/MAGNESIUM 324 MG
81 TABLET ORAL DAILY
Refills: 0 | Status: DISCONTINUED | OUTPATIENT
Start: 2023-02-10 | End: 2023-02-12

## 2023-02-10 RX ORDER — SODIUM CHLORIDE 9 MG/ML
1000 INJECTION, SOLUTION INTRAVENOUS
Refills: 0 | Status: DISCONTINUED | OUTPATIENT
Start: 2023-02-10 | End: 2023-02-11

## 2023-02-10 RX ORDER — HEPARIN SODIUM 5000 [USP'U]/ML
3500 INJECTION INTRAVENOUS; SUBCUTANEOUS EVERY 6 HOURS
Refills: 0 | Status: DISCONTINUED | OUTPATIENT
Start: 2023-02-10 | End: 2023-02-10

## 2023-02-10 RX ORDER — GLUCAGON INJECTION, SOLUTION 0.5 MG/.1ML
1 INJECTION, SOLUTION SUBCUTANEOUS ONCE
Refills: 0 | Status: DISCONTINUED | OUTPATIENT
Start: 2023-02-10 | End: 2023-02-11

## 2023-02-10 RX ORDER — METOPROLOL TARTRATE 50 MG
25 TABLET ORAL DAILY
Refills: 0 | Status: DISCONTINUED | OUTPATIENT
Start: 2023-02-10 | End: 2023-02-12

## 2023-02-10 RX ORDER — HEPARIN SODIUM 5000 [USP'U]/ML
INJECTION INTRAVENOUS; SUBCUTANEOUS
Qty: 25000 | Refills: 0 | Status: DISCONTINUED | OUTPATIENT
Start: 2023-02-10 | End: 2023-02-11

## 2023-02-10 RX ORDER — SACUBITRIL AND VALSARTAN 24; 26 MG/1; MG/1
1 TABLET, FILM COATED ORAL
Qty: 0 | Refills: 0 | DISCHARGE

## 2023-02-10 RX ORDER — HEPARIN SODIUM 5000 [USP'U]/ML
3200 INJECTION INTRAVENOUS; SUBCUTANEOUS EVERY 6 HOURS
Refills: 0 | Status: DISCONTINUED | OUTPATIENT
Start: 2023-02-10 | End: 2023-02-11

## 2023-02-10 RX ORDER — ATORVASTATIN CALCIUM 80 MG/1
40 TABLET, FILM COATED ORAL AT BEDTIME
Refills: 0 | Status: DISCONTINUED | OUTPATIENT
Start: 2023-02-10 | End: 2023-02-12

## 2023-02-10 RX ORDER — CLOPIDOGREL BISULFATE 75 MG/1
75 TABLET, FILM COATED ORAL DAILY
Refills: 0 | Status: DISCONTINUED | OUTPATIENT
Start: 2023-02-10 | End: 2023-02-12

## 2023-02-10 RX ORDER — DEXTROSE 50 % IN WATER 50 %
12.5 SYRINGE (ML) INTRAVENOUS ONCE
Refills: 0 | Status: DISCONTINUED | OUTPATIENT
Start: 2023-02-10 | End: 2023-02-11

## 2023-02-10 RX ORDER — ONDANSETRON 8 MG/1
4 TABLET, FILM COATED ORAL ONCE
Refills: 0 | Status: DISCONTINUED | OUTPATIENT
Start: 2023-02-10 | End: 2023-02-11

## 2023-02-10 RX ORDER — HEPARIN SODIUM 5000 [USP'U]/ML
INJECTION INTRAVENOUS; SUBCUTANEOUS
Qty: 25000 | Refills: 0 | Status: DISCONTINUED | OUTPATIENT
Start: 2023-02-10 | End: 2023-02-10

## 2023-02-10 RX ORDER — FUROSEMIDE 40 MG
1 TABLET ORAL
Qty: 0 | Refills: 0 | DISCHARGE

## 2023-02-10 RX ORDER — ASPIRIN/CALCIUM CARB/MAGNESIUM 324 MG
1 TABLET ORAL
Qty: 0 | Refills: 0 | DISCHARGE

## 2023-02-10 RX ORDER — DAPAGLIFLOZIN 10 MG/1
1 TABLET, FILM COATED ORAL
Qty: 0 | Refills: 0 | DISCHARGE

## 2023-02-10 RX ORDER — PANTOPRAZOLE SODIUM 20 MG/1
40 TABLET, DELAYED RELEASE ORAL
Refills: 0 | Status: DISCONTINUED | OUTPATIENT
Start: 2023-02-10 | End: 2023-02-12

## 2023-02-10 RX ORDER — INSULIN LISPRO 100/ML
VIAL (ML) SUBCUTANEOUS
Refills: 0 | Status: DISCONTINUED | OUTPATIENT
Start: 2023-02-10 | End: 2023-02-11

## 2023-02-10 RX ORDER — FUROSEMIDE 40 MG
20 TABLET ORAL DAILY
Refills: 0 | Status: DISCONTINUED | OUTPATIENT
Start: 2023-02-10 | End: 2023-02-10

## 2023-02-10 RX ORDER — DEXTROSE 50 % IN WATER 50 %
15 SYRINGE (ML) INTRAVENOUS ONCE
Refills: 0 | Status: DISCONTINUED | OUTPATIENT
Start: 2023-02-10 | End: 2023-02-11

## 2023-02-10 RX ORDER — INSULIN LISPRO 100/ML
VIAL (ML) SUBCUTANEOUS AT BEDTIME
Refills: 0 | Status: DISCONTINUED | OUTPATIENT
Start: 2023-02-10 | End: 2023-02-11

## 2023-02-10 RX ORDER — CHLORHEXIDINE GLUCONATE 213 G/1000ML
1 SOLUTION TOPICAL
Refills: 0 | Status: DISCONTINUED | OUTPATIENT
Start: 2023-02-10 | End: 2023-02-12

## 2023-02-10 RX ORDER — SACUBITRIL AND VALSARTAN 24; 26 MG/1; MG/1
1 TABLET, FILM COATED ORAL
Refills: 0 | Status: DISCONTINUED | OUTPATIENT
Start: 2023-02-10 | End: 2023-02-10

## 2023-02-10 RX ADMIN — HEPARIN SODIUM 700 UNIT(S)/HR: 5000 INJECTION INTRAVENOUS; SUBCUTANEOUS at 16:03

## 2023-02-10 RX ADMIN — ATORVASTATIN CALCIUM 40 MILLIGRAM(S): 80 TABLET, FILM COATED ORAL at 22:46

## 2023-02-10 NOTE — H&P ADULT - NSHPPHYSICALEXAM_GEN_ALL_CORE
GENERAL: NAD, well-groomed, well-developed  HEAD:  Atraumatic, Normocephalic  EYES: EOMI, PERRLA, conjunctiva and sclera clear  ENMT: No tonsillar erythema, exudates, or enlargement; Moist mucous membranes  NECK: Supple, No JVD, Normal thyroid  HEART: Regular rate and rhythm; No murmurs, rubs, or gallops  RESPIRATORY: CTA B/L, No W/R/R  ABDOMEN: Soft, Nontender, Nondistended; Bowel sounds present  NEUROLOGY: A&Ox3, nonfocal, moving all extremities  EXTREMITIES:  2+ Peripheral Pulses, No clubbing, cyanosis, or edema  SKIN: warm, dry, normal color, no rash or abnormal lesions GENERAL: NAD, well-groomed, well-developed  HEAD:  Atraumatic, Normocephalic  EYES: EOMI, PERRLA, conjunctiva and sclera clear  ENMT: No tonsillar erythema, exudates, or enlargement; Moist mucous membranes  NECK: Supple, No JVD, Normal thyroid  HEART: Regular rate and rhythm; No murmurs, rubs, or gallops  RESPIRATORY: CTA B/L, No W/R/R  ABDOMEN: Soft, Nontender, Nondistended; Bowel sounds present  NEUROLOGY: A&Ox3, nonfocal, moving all extremities  EXTREMITIES:  2+ Peripheral Pulses, No clubbing, cyanosis, or edema  SKIN: warm, dry, normal color, no rash or abnormal lesions    Pt has IABP via Left femoral access and 1 peripheral IV. GENERAL: NAD, well-groomed, well-developed  HEAD:  Atraumatic, Normocephalic  EYES: EOMI, b/l cataracts present , w limited vision   ENMT: No tonsillar erythema, exudates, or enlargement; Moist mucous membranes  NECK: Supple, No JVD, Normal thyroid  HEART: Regular rate and rhythm; No murmurs, rubs, or gallops  RESPIRATORY: CTA B/L, No W/R/R  ABDOMEN: Soft, Nontender, Nondistended; Bowel sounds present  NEUROLOGY: A&Ox3, nonfocal, moving all extremities  EXTREMITIES:  2+ Peripheral Pulses, No clubbing, cyanosis, or edema  SKIN: warm, dry, normal color, no rash or abnormal lesions    Pt has IABP via Left femoral access and 1 peripheral IV.

## 2023-02-10 NOTE — H&P ADULT - NSHPROSALLOTHERNEGRD_GEN_ALL_CORE
Discussion/Summary   Patient was called reviewed his scrotal ultrasound  He was found to have bilateral hydroceles as well as epididymal cyst/spermatocele  At this time, given small size he was advised that it is unlikely any treatment/management will be taken for this  If any symptoms should worsen, he was advised to follow up     Verified Results  [address] [facility] [id number] 12:28PM Kerline Welch Order Number: [id number]    - Patient Instructions: To schedule this appointment, please contact Central Scheduling at [phone]  Test Name Result Flag Reference   US SCROTUM AND TESTICLES (Report)     SCROTAL ULTRASOUND     INDICATION: N50 [id number]: Other specified disorders of the male genital organs  History taken directly from the electronic ordering system  History of hydrocele, follow-up  COMPARISON: 9/18/2017     TECHNIQUE:  Ultrasound the scrotal contents was performed with a high frequency linear transducer utilizing volumetric sweep imaging as well as standard still image techniques  Imaging performed in longitudinal and transverse orientation  Color and    spectral Doppler evaluation also performed bilaterally  FINDINGS:     TESTES:    Testes are symmetric and normal in size  RIGHT testis = 4 3 x 2 8 x 3 6 cm    Normal contour with homogeneous smooth echotexture  No intratesticular mass lesion or calcifications  LEFT testis = 4 1 x 2 6 x 3 1 cm   Normal contour with homogeneous smooth echotexture  No intratesticular mass lesion or calcifications  Doppler flow within both testes is present and symmetric  EPIDIDYMIDES:    Normal Size  Doppler ultrasound demonstrates normal blood flow  There is a tiny right epididymal head cyst or spermatocele measuring 3 mm  Stable tiny left epididymal head cyst or spermatocele measuring 5 mm  HYDROCELE: Small bilateral hydroceles  VARICOCELE: None present       SCROTUM: Scrotal thickness and appearance within normal limits  No evidence for extratesticular mass or hernia demonstrated  Stable tiny left scrotal jane  IMPRESSION:      Small bilateral hydroceles  Tiny bilateral epididymal head cysts or spermatoceles  Tiny left scrotal jane again noted  Normal testes         Workstation performed: [id number]     Signed by:   Maryalice Goltz, MD   1/10/18 All other review of systems negative, except as noted in HPI

## 2023-02-10 NOTE — H&P ADULT - ASSESSMENT
ASSESSMENT:  CAROL LOPEZ is a 78y Female with history of     PLAN:  NEURO:  -Monitor mental status  -Imaging?  -Pain management?  -Sedation?    RESPIRATORY:   -Monitor respiratory status  -Monitor O2 sat  -F/u abg, CXR    CARDIOVASCULAR:  -Echo?  -Home meds?  -Pressors?  -Monitor VS and perfusion status  -Trend lactate?  - PA catheter values: CVP, PAP, PCW, CI, CO, SVR, mVO2    GI/NUTRITION:  -Feeds?  -Antiemetic?  -NG/OG tube?  -GI ppx?  -Bowel regimen?    GENITOURINARY/RENAL:  -Output?  -Geiger?  -Fluids?  -Monitor electrolytes  -CrCl?    HEMATOLOGIC:  -Monitor H&H  -DVT ppx?    INFECTIOUS DISEASE:  -Abx?  -BCx/UCx?  -Monitor for fevers, trend WBC    ENDOCRINE:  -Monitor glucose  -ISS?    Lines:    DISPO:            ASSESSMENT:  CAROL LOPEZ is an 79 y/o Female with PMhx of CHF, HTN, HLD, Severe MR,  RA, Giant cell arteritis, MI 2021 with no stents placed admitted to CICU s/p LHC/RHC with stents and IABP placed via left femoral access. In stable condition.     PLAN:  NEURO:  -AxO x3   - no active issues     RESPIRATORY:   - satting well on RA  - no active issues     CARDIOVASCULAR:  - s/p cardiac cath 2/10: s/p POBA to D1, dLM 70%x 1, pLAD 70% x 1 stents. RFA sheath. IABP on LFA. ASA, Plavix loaded. Heparin started in CICU  -TTE 1/17 showed: EF of 29, severe MR, Moderately dilated left atrium. Mild left ventricular enlargement. Severe segmental left ventricular systolic dysfunction. No LV thrombus  seen. Moderate diastolic dysfunction (Stage II). Right ventricular enlargement with decreased right ventricular systolic function. severe pulmonary hypertension. Moderate-severe tricuspid  regurgitation.  - ASA 81, Plavix 75, Atorvastatin 40, metroprolol succinate 25,  - Lasix 20, entresto 24/26 BID   - Patient specific heparin gtt per ACS protocol normogram, target aPTT 53-73      -Home meds?  -Pressors?  -Monitor VS and perfusion status  -Trend lactate?  - PA catheter values: CVP, PAP, PCW, CI, CO, SVR, mVO2      GI/NUTRITION:  -protonix 40  -DASH/TLC diet     -Feeds?  -Antiemetic?  -NG/OG tube?  -GI ppx?  -Bowel regimen?    GENITOURINARY/RENAL:  -Output?  -Geiger?  -Fluids?  -Monitor electrolytes  -CrCl?    HEMATOLOGIC:  -Monitor H&H  -DVT ppx?    INFECTIOUS DISEASE:  -Abx?  -BCx/UCx?  -Monitor for fevers, trend WBC    ENDOCRINE:  -Monitor glucose  -ISS  -A1C, TSH, lipids ordered     Lines:    DISPO:            ASSESSMENT:  CAROL LOPEZ is an 77 y/o Female with PMhx of CHF, HTN, HLD, Severe MR,  RA, Giant cell arteritis, MI 2021 with no stents placed admitted to CICU s/p LHC/RHC with stents and IABP placed via left femoral access. In stable condition.     PLAN:  NEURO:  -AxO x3   - no active issues     RESPIRATORY:   - satting well on RA  - no active issues     CARDIOVASCULAR:  #HFrEF   - s/p cardiac cath 2/10: s/p POBA to D1, dLM 70%x 1, pLAD 70% x 1 stents. RFA sheath. IABP on LFA to increase coronary perfusion i/s/o large clot burden in LCX and Diag. ASA, Plavix loaded. Heparin started in CICU  -TTE 1/17 showed: EF of 29, severe MR, Moderately dilated left atrium. Mild left ventricular enlargement. Severe segmental left ventricular systolic dysfunction. No LV thrombus  seen. Moderate diastolic dysfunction (Stage II). Right ventricular enlargement with decreased right ventricular systolic function. severe pulmonary hypertension. Moderate-severe tricuspid  regurgitation.  - ASA 81, Plavix 75, Atorvastatin 40, metroprolol succinate 25,  - Lasix 20, entresto 24/26 BID   - Patient specific heparin gtt per ACS protocol normogram, target aPTT 53-73  - on Farxiga 5 at home, currently held       GI/NUTRITION:  - DASH/TLC diet   -protonix 40 for GI ppx   - no active issues       GENITOURINARY/RENAL:  -no active issues    HEMATOLOGIC:  -monitor H/H. Patient has chronic anemia with baseline Hgb of 9's     -DVT ppx: Patient specific heparin gtt per ACS protocol normogram, target aPTT 53-73    INFECTIOUS DISEASE:  -no active issues     ENDOCRINE:  -Monitor glucose, currently stable   -ISS  -A1C, TSH, lipids ordered     Lines: IABP via left femoral access, 1 peripheral IV     DISPO: CICU            ASSESSMENT:  CAROL LOPEZ is an 77 y/o Female with PMhx of CHF, HTN, HLD, Severe MR,  RA, Giant cell arteritis, MI 2021 with no stents placed admitted to CICU s/p LHC/RHC with stents and IABP placed via left femoral access. In stable condition.     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 RFA sheath. IABP on LFA to increase coronary perfusion i/s/o large clot burden in LCX and Diag. ASA, Plavix loaded. Heparin started in CICU  -TTE 1/17 showed: EF of 29, severe MR, Moderately dilated left atrium. Mild left ventricular enlargement. Severe segmental left ventricular systolic dysfunction. No LV thrombus  seen. Moderate diastolic dysfunction (Stage II). Right ventricular enlargement with decreased right ventricular systolic function. severe pulmonary hypertension. Moderate-severe tricuspid  regurgitation.  - ASA 81, Plavix 75, Atorvastatin 40, metroprolol succinate 25,  - Lasix 20, entresto 24/26 BID   - Patient specific heparin gtt per ACS protocol normogram, target aPTT 53-73  - on Farxiga 5 at home, currently held       GI/NUTRITION:  - DASH/TLC diet   -protonix 40 for GI ppx   - no active issues       GENITOURINARY/RENAL:  -no active issues    HEMATOLOGIC:  -monitor H/H. Patient has chronic anemia with baseline Hgb of 9's     -DVT ppx: Patient specific heparin gtt per ACS protocol normogram, target aPTT 53-73    INFECTIOUS DISEASE:  -no active issues     ENDOCRINE:  -Monitor glucose, currently stable   -ISS  -A1C, TSH, lipids ordered     Lines: IABP via left femoral access, 1 peripheral IV     DISPO: CICU

## 2023-02-10 NOTE — PROGRESS NOTE ADULT - SUBJECTIVE AND OBJECTIVE BOX
CAROL LOPEZ  MRN-[id number]  Patient is a 78y old  Female who presents with a chief complaint of   HPI:  79 y/o Female with PMhx of CHF, HTN, HLD, Severe MR,  RA, Giant cell arteritis, MI with no stent presents today for RHC/LHC. Patient was progressively getting more fatigued for the past several months as well as dyspnea on exertion. Had TTE which shows severe MR.  Was seen by Dr. Manriquez and referred for angiogram. Pt has been evaluated by Dr. Steinberg for MR.  Denies any implanted cardiac device.   TTE from Jan 2023: EF 29%  CONCLUSIONS:  1. Mitral annular calcification with tethered mitral valve  leaflets. Severe mitral regurgitation with an eccentric,  posteriorly directed jet.  2. Moderately dilated left atrium.  LA volume index = 42  cc/m2.  3. Mild left ventricular enlargement.  4. Severe segmental left ventricular systolic dysfunction.  Hypokinesis of the inferior wall, inferolateral wall, basal  to mid anterolateral wall, and basal inferoseptum.  Endocardial visualization enhanced with intravenous  injection of echo contrast (Definity).  No LV thrombus  seen.  5. Moderate diastolic dysfunction (Stage II).  6. Right ventricular enlargement with decreased right  ventricular systolic function.  7. Estimated right ventricular systolic pressure equals 65  mm Hg, assuming right atrial pressure equals 8 mm Hg,  consistent with severe pulmonary hypertension.  8. Normal tricuspid valve.  Moderate-severe tricuspid  regurgitation.  *** No previous Echo exam.   (10 Feb 2023 07:02)      Hospital Course:  2/10 Transferred to CCU for further management     24 HOUR EVENTS:    REVIEW OF SYSTEMS:    CONSTITUTIONAL: No weakness, fevers or chills  EYES/ENT: No visual changes;  No vertigo or throat pain   NECK: No pain or stiffness  RESPIRATORY: No cough, wheezing, hemoptysis; No shortness of breath  CARDIOVASCULAR: No chest pain or palpitations  GASTROINTESTINAL: No abdominal or epigastric pain. No nausea, vomiting, or hematemesis; No diarrhea or constipation. No melena or hematochezia.  GENITOURINARY: No dysuria, frequency or hematuria  NEUROLOGICAL: No numbness or weakness  SKIN: No itching, rashes      ICU Vital Signs Last 24 Hrs  T(C): 37 (10 Feb 2023 20:00), Max: 37 (10 Feb 2023 20:00)  T(F): 98.6 (10 Feb 2023 20:00), Max: 98.6 (10 Feb 2023 20:00)  HR: 70 (10 Feb 2023 20:00) (62 - 78)  BP: 91/55 (10 Feb 2023 20:00) (81/52 - 153/72)  BP(mean): 70 (10 Feb 2023 20:00) (62 - 93)  ABP: --  ABP(mean): --  RR: 22 (10 Feb 2023 20:00) (9 - 33)  SpO2: 99% (10 Feb 2023 20:00) (93% - 100%)    O2 Parameters below as of 10 Feb 2023 20:00  Patient On (Oxygen Delivery Method): room air            CVP(mm Hg): --  CO: --  CI: --  PA: --  PA(mean): --  PA(direct): --  PCWP: --  LA: --  RA: --  SVR: --  SVRI: --  PVR: --  PVRI: --  I&O's Summary    10 Feb 2023 07:01  -  10 Feb 2023 20:57  --------------------------------------------------------  IN: 335 mL / OUT: 750 mL / NET: -415 mL        CAPILLARY BLOOD GLUCOSE    CAPILLARY BLOOD GLUCOSE      POCT Blood Glucose.: 100 mg/dL (10 Feb 2023 06:54)      PHYSICAL EXAM:  GENERAL: No acute distress, well-developed  HEAD:  Atraumatic, Normocephalic  EYES: EOMI, PERRLA, conjunctiva and sclera clear  NECK: Supple, no lymphadenopathy, no JVD  CHEST/LUNG: CTAB; No wheezes, rales, or rhonchi  HEART: Regular rate and rhythm. Normal S1/S2. No murmurs, rubs, or gallops  ABDOMEN: Soft, non-tender, non-distended; normal bowel sounds, no organomegaly  EXTREMITIES:  2+ peripheral pulses b/l, No clubbing, cyanosis, or edema  NEUROLOGY: A&O x 3, no focal deficits  SKIN: No rashes or lesions    ============================I/O===========================   I&O's Detail    10 Feb 2023 07:01  -  10 Feb 2023 20:57  --------------------------------------------------------  IN:    Heparin Infusion: 35 mL    Oral Fluid: 300 mL  Total IN: 335 mL    OUT:    Voided (mL): 750 mL  Total OUT: 750 mL    Total NET: -415 mL        ============================ LABS =========================                        9.8    4.61  )-----------( 230      ( 10 Feb 2023 07:10 )             31.3     02-10    138  |  102  |  28<H>  ----------------------------<  101<H>  4.0   |  24  |  1.02    Ca    10.1      10 Feb 2023 07:10                            ======================Micro/Rad/Cardio=================  Telemtry: Reviewed   EKG: Reviewed  CXR: Reviewed  Culture: Reviewed   Echo:   Cath:   ======================================================  PAST MEDICAL & SURGICAL HISTORY:  H/O CHF      Benign essential HTN      HLD (hyperlipidemia)      DM (diabetes mellitus)      Rheumatoid arthritis      Giant cell arteritis        ====================ASSESSMENT ==============  CAROL LOPEZ is an 79 y/o Female with PMhx of CHF, HTN, HLD, Severe MR,  RA, Giant cell arteritis, MI 2021 with no stents placed admitted to CICU s/p LHC/RHC with stents and IABP placed via left femoral access. In stable condition.                 Plan:  ====================== NEUROLOGY=====================  -AxO x3   - Patient has poor vision R < L 2/2 GCA (dx 8/2019) and b/l cataracts   - no active issues       ==================== RESPIRATORY======================  - satting well on RA  - no active issues       ====================CARDIOVASCULAR==================  HFrEF   - s/p cardiac cath 2/10: s/p POBA to D1, dLM 70%x 1, pLAD 70% x 1 stents. RFA sheath. IABP on LFA to increase coronary perfusion i/s/o large clot burden in LCX and Diag. ASA, Plavix loaded. Heparin started in CICU  -TTE 1/17 showed: EF of 29, severe MR, Moderately dilated left atrium. Mild left ventricular enlargement. Severe segmental left ventricular systolic dysfunction. No LV thrombus  seen. Moderate diastolic dysfunction (Stage II). Right ventricular enlargement with decreased right ventricular systolic function. severe pulmonary hypertension. Moderate-severe tricuspid  regurgitation.  - ASA 81, Plavix 75, Atorvastatin 40, metroprolol succinate 25,  - Hold entresto for now   - Patient specific heparin gtt per ACS protocol normogram, target aPTT 53-73  - on Farxiga 5 at home, currently held     metoprolol succinate ER 25 milliGRAM(s) Oral daily  aspirin enteric coated 81 milliGRAM(s) Oral daily  clopidogrel Tablet 75 milliGRAM(s) Oral daily  atorvastatin 40 milliGRAM(s) Oral at bedtime  ===================HEMATOLOGIC/ONC ===================  -monitor H/H. Patient has chronic anemia with baseline Hgb of 9's     -DVT ppx: Patient specific heparin gtt per ACS protocol normogram, target aPTT 53-73    heparin   Injectable 3500 Unit(s) IV Push every 6 hours PRN For aPTT less than 40  heparin  Infusion.  Unit(s)/Hr (7 mL/Hr) IV Continuous <Continuous>    ===================== RENAL =========================  Continue monitoring urine output    ==================== GASTROINTESTINAL===================  - DASH/TLC diet   -protonix 40 for GI ppx   - no active issues   - Zofran PRN for nausea/vomiting     dextrose 5%. 1000 milliLiter(s) (50 mL/Hr) IV Continuous <Continuous>  dextrose 5%. 1000 milliLiter(s) (100 mL/Hr) IV Continuous <Continuous>  pantoprazole    Tablet 40 milliGRAM(s) Oral before breakfast  ondansetron Injectable 4 milliGRAM(s) IV Push once  =======================    ENDOCRINE  =====================  -Monitor glucose, currently stable   -ISS  -A1C, TSH, lipids ordered     dextrose 50% Injectable 25 Gram(s) IV Push once  dextrose 50% Injectable 12.5 Gram(s) IV Push once  dextrose 50% Injectable 25 Gram(s) IV Push once  dextrose Oral Gel 15 Gram(s) Oral once PRN Blood Glucose LESS THAN 70 milliGRAM(s)/deciliter  glucagon  Injectable 1 milliGRAM(s) IntraMuscular once  insulin lispro (ADMELOG) corrective regimen sliding scale   SubCutaneous three times a day before meals  insulin lispro (ADMELOG) corrective regimen sliding scale   SubCutaneous at bedtime    ========================INFECTIOUS DISEASE================  -no active issues       Patient requires continuous monitoring with bedside rhythm monitoring, pulse ox monitoring, and intermittent blood gas analysis. Care plan discussed with ICU care team. Patient remained critical and at risk for life threatening decompensation.  Patient seen, examined and plan discussed with CCU team during rounds.     I have personally provided ____ minutes of critical care time excluding time spent on separate procedures, in addition to initial critical care time provided by the CICU Attending, Dr. Liu    By signing my name below, I, Zafar Ward, attest that this documentation has been prepared under the direction and in the presence of MIREYA Benitez   Electronically signed: Dar Olivera, 02-10-23 @ 20:57    I, MIREYA Benitez, personally performed the services described in this documentation. all medical record entries made by the olyaibe were at my direction and in my presence. I have reviewed the chart and agree that the record reflects my personal performance and is accurate and complete  Electronically signed: MIREYA Benitez        CAROL LOPEZ  MRN-[id number]  Patient is a 78y old  Female who presents with a chief complaint of   HPI:  77 y/o Female with PMhx of CHF, HTN, HLD, Severe MR,  RA, Giant cell arteritis, MI with no stent presents today for RHC/LHC. Patient was progressively getting more fatigued for the past several months as well as dyspnea on exertion. Had TTE which shows severe MR.  Was seen by Dr. Manriquez and referred for angiogram. Pt has been evaluated by Dr. Steinberg for MR.  Denies any implanted cardiac device.   TTE from Jan 2023: EF 29%  CONCLUSIONS:  1. Mitral annular calcification with tethered mitral valve  leaflets. Severe mitral regurgitation with an eccentric,  posteriorly directed jet.  2. Moderately dilated left atrium.  LA volume index = 42  cc/m2.  3. Mild left ventricular enlargement.  4. Severe segmental left ventricular systolic dysfunction.  Hypokinesis of the inferior wall, inferolateral wall, basal  to mid anterolateral wall, and basal inferoseptum.  Endocardial visualization enhanced with intravenous  injection of echo contrast (Definity).  No LV thrombus  seen.  5. Moderate diastolic dysfunction (Stage II).  6. Right ventricular enlargement with decreased right  ventricular systolic function.  7. Estimated right ventricular systolic pressure equals 65  mm Hg, assuming right atrial pressure equals 8 mm Hg,  consistent with severe pulmonary hypertension.  8. Normal tricuspid valve.  Moderate-severe tricuspid  regurgitation.  *** No previous Echo exam.   (10 Feb 2023 07:02)      Hospital Course:  2/10 Transferred to CCU for further management     24 HOUR EVENTS:  presenting of elective LHC/RHC, found with 70% LM req POBA and TOÑA x1 with IABP placement  IABP wean overnight    REVIEW OF SYSTEMS:    CONSTITUTIONAL: No weakness, fevers or chills  EYES/ENT: No visual changes;  No vertigo or throat pain   NECK: No pain or stiffness  RESPIRATORY: No cough, wheezing, hemoptysis; No shortness of breath  CARDIOVASCULAR: No chest pain or palpitations  GASTROINTESTINAL: No abdominal or epigastric pain. No nausea, vomiting, or hematemesis; No diarrhea or constipation. No melena or hematochezia.  GENITOURINARY: No dysuria, frequency or hematuria  NEUROLOGICAL: No numbness or weakness  SKIN: No itching, rashes      ICU Vital Signs Last 24 Hrs  T(C): 37 (10 Feb 2023 20:00), Max: 37 (10 Feb 2023 20:00)  T(F): 98.6 (10 Feb 2023 20:00), Max: 98.6 (10 Feb 2023 20:00)  HR: 70 (10 Feb 2023 20:00) (62 - 78)  BP: 91/55 (10 Feb 2023 20:00) (81/52 - 153/72)  BP(mean): 70 (10 Feb 2023 20:00) (62 - 93)  RR: 22 (10 Feb 2023 20:00) (9 - 33)  SpO2: 99% (10 Feb 2023 20:00) (93% - 100%)    O2 Parameters below as of 10 Feb 2023 20:00  Patient On (Oxygen Delivery Method): room air            I&O's Summary    10 Feb 2023 07:01  -  10 Feb 2023 20:57  --------------------------------------------------------  IN: 335 mL / OUT: 750 mL / NET: -415 mL        CAPILLARY BLOOD GLUCOSE    CAPILLARY BLOOD GLUCOSE      POCT Blood Glucose.: 100 mg/dL (10 Feb 2023 06:54)      ============================I/O===========================   I&O's Detail    10 Feb 2023 07:01  -  10 Feb 2023 20:57  --------------------------------------------------------  IN:    Heparin Infusion: 35 mL    Oral Fluid: 300 mL  Total IN: 335 mL    OUT:    Voided (mL): 750 mL  Total OUT: 750 mL    Total NET: -415 mL        ============================ LABS =========================                        9.8    4.61  )-----------( 230      ( 10 Feb 2023 07:10 )             31.3     02-10    138  |  102  |  28<H>  ----------------------------<  101<H>  4.0   |  24  |  1.02    Ca    10.1      10 Feb 2023 07:10                            ======================Micro/Rad/Cardio=================  Telemtry: Reviewed   EKG: Reviewed  CXR: Reviewed  Culture: Reviewed   Echo:   Cath:   ======================================================  PAST MEDICAL & SURGICAL HISTORY:  H/O CHF      Benign essential HTN      HLD (hyperlipidemia)      DM (diabetes mellitus)      Rheumatoid arthritis      Giant cell arteritis        ====================ASSESSMENT ==============  CAROL LOPEZ is an 77 y/o Female with PMhx of CHF, HTN, HLD, Severe MR,  RA, Giant cell arteritis, MI 2021 with no stents placed [facility]RHC with stents and IABP placed via left femoral access. In stable condition.     Plan:  ====================== NEUROLOGY=====================  AxO x3   - Patient has poor vision R < L 2/2 GCA (dx 8/2019) and b/l cataracts   - continue to monitor mental status per ICU protocol     ==================== RESPIRATORY======================  Comfortable on RA  - continue to monitor SpO2 with goal >94%     ====================CARDIOVASCULAR==================  HFrEF   - s/p cardiac cath 2/10: s/p POBA to D1, dLM 70%x 1, pLAD 70% x 1 stents. RFA sheath. IABP on LFA to increase coronary perfusion i/s/o large clot burden in LCX and Diag. ASA, Plavix loaded  - c/w maintenance DAPT  - C/w IABP 1:1 augmentation. Heparin gtt for full AC. Once therapuetic, plan to trial wean of IABP on 1:2 augmentation, and if andry 1:3 augmentation   -TTE 1/17 showed: EF of 29, severe MR, Moderately dilated left atrium. Mild left ventricular enlargement. Severe segmental left ventricular systolic dysfunction. No LV thrombus  seen. Moderate diastolic dysfunction (Stage II). Right ventricular enlargement with decreased right ventricular systolic function. severe pulmonary hypertension. Moderate-severe tricuspid regurgitation.  - plan for rpt TTE once IABP removed   - c/w Atorvastatin 40  - c/w metroprolol succinate 25,  - Hold entresto for now   - on Farxiga 5 at home, currently held     metoprolol succinate ER 25 milliGRAM(s) Oral daily  aspirin enteric coated 81 milliGRAM(s) Oral daily  clopidogrel Tablet 75 milliGRAM(s) Oral daily  atorvastatin 40 milliGRAM(s) Oral at bedtime  ===================HEMATOLOGIC/ONC ===================  Normocytic anemia   -monitor H/H. Patient has chronic anemia with baseline Hgb of 9's     -DVT ppx: Patient specific heparin gtt for IABP, target aPTT 53-73    ===================== RENAL =========================  SCr wnl  - continue to monitor and trend SCr, BUN. lytes, and I&Os  - replete lytes prn with goal K 4-4.5 and Mg >2     ==================== GASTROINTESTINAL===================  - DASH/TLC diet   -protonix 40 for GERD (home med)     =======================    ENDOCRINE  =====================  -Monitor glucose, currently stable   -ISS ordered, no history of DM? FS stable, if continues to be at goal (100-180), will discontinue   -A1C, TSH, lipids ordered     ========================INFECTIOUS DISEASE================  Afebrile, no leukocytosis  - Monitor and trend wbc and temp curve       Patient requires continuous monitoring with bedside rhythm monitoring, pulse ox monitoring, and intermittent blood gas analysis. Care plan discussed with ICU care team. Patient remained critical and at risk for life threatening decompensation.  Patient seen, examined and plan discussed with CCU team during rounds.     I have personally provided __40__ minutes of critical care time excluding time spent on separate procedures, in addition to initial critical care time provided by the CICU Attending, Dr. Liu    By signing my name below, I, Zafar Ward, attest that this documentation has been prepared under the direction and in the presence of MIREYA Benitez   Electronically signed: Dar Olivera, 02-10-23 @ 20:57    I, MIRYEA Benitez, personally performed the services described in this documentation. all medical record entries made by the olyaibchacha were at my direction and in my presence. I have reviewed the chart and agree that the record reflects my personal performance and is accurate and complete  Electronically signed: MIREYA Benitez

## 2023-02-10 NOTE — H&P CARDIOLOGY - NSICDXPASTMEDICALHX_GEN_ALL_CORE_FT
PAST MEDICAL HISTORY:  Benign essential HTN     DM (diabetes mellitus)     Giant cell arteritis     H/O CHF     HLD (hyperlipidemia)     Rheumatoid arthritis
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
## 2023-02-10 NOTE — H&P ADULT - NSHPREVIEWOFSYSTEMS_GEN_ALL_CORE
CONSTITUTIONAL: No weakness, fevers or chills  EYES/ENT: No visual changes;  No vertigo or throat pain   NECK: No pain or stiffness  RESPIRATORY: No cough, wheezing, hemoptysis; No shortness of breath  CARDIOVASCULAR: No chest pain or palpitations  GASTROINTESTINAL: No abdominal or epigastric pain. No nausea, vomiting, or hematemesis; No diarrhea or constipation. No melena or hematochezia.  GENITOURINARY: No dysuria, frequency or hematuria  NEUROLOGICAL: No numbness or weakness  SKIN: No itching, rashes CONSTITUTIONAL: No weakness, fevers or chills  EYES/ENT: +very limited vision with  R eye worse than left  2/2 GCA R side and b/l cataracts. No vertigo or throat pain   NECK: No pain or stiffness  RESPIRATORY: No cough, wheezing, hemoptysis; No shortness of breath   CARDIOVASCULAR: No chest pain or palpitations  GASTROINTESTINAL: No abdominal or epigastric pain. No nausea, vomiting, or hematemesis; No diarrhea or constipation. No melena or hematochezia.  GENITOURINARY: No dysuria, frequency or hematuria  NEUROLOGICAL: No numbness or weakness  SKIN: No itching, rashes

## 2023-02-10 NOTE — H&P ADULT - NSHPLABSRESULTS_GEN_ALL_CORE
9.8    4.61  )-----------( 230      ( 10 Feb 2023 07:10 )             31.3       02-10    138  |  102  |  28<H>  ----------------------------<  101<H>  4.0   |  24  |  1.02    Ca    10.1      10 Feb 2023 07:10            CAPILLARY BLOOD GLUCOSE      POCT Blood Glucose.: 100 mg/dL (10 Feb 2023 06:54)

## 2023-02-10 NOTE — CHART NOTE - NSCHARTNOTEFT_GEN_A_CORE
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 note with the following additions:    79 yo woman with severe MR presented for elective LHC found to have significant LM ds and evidence of thrombus LAD s/p PCI and IABP placement, transferred to CICU for further management    A+Ox3  Hemodynamics acceptable, no pressors or inotropes  on IABP post PCI due to transient hypotension and instability   for now will hold entresto   cont BB   will attempt to wean as tolerated   O2 sats mid to high 90s on room air  DASH diet  Normal renal function  H/H low but acceptable on Heparin drip for IABP  Afebrile, no antibiotics  Sugars controlled  IABP 2/10    Patient is critically ill, requiring critical care services.     Attending: I have personally and independently provided 75 minutes of critical care services.  This excludes any time spent on separate procedures or teaching.

## 2023-02-10 NOTE — CHART NOTE - NSCHARTNOTEFT_GEN_A_CORE
Removal of Femoral Sheath    Pulses in the right lower extremity are palpable. The patient was placed in the supine position. The insertion site was identified and the sutures were removed per protocol.  The _5 & 7___ [place] femoral sheath was then removed. Direct pressure was applied for  __20____ minutes.     Monitoring of the right groin and both lower extremities including neuro-vascular checks and vital signs every 15 minutes x 4, then every 30 minutes x 2, then every 1 hour was ordered.    Complications: None/Other    Comments: Pt had IAMP via LFA. Bed rest until removal of IABP. Waiting to transfer to CCU

## 2023-02-10 NOTE — H&P CARDIOLOGY - HISTORY OF PRESENT ILLNESS
79 y/o Female with PMhx of CHF, HTN, HLD, Severe MR,  RA, Giant cell arteritis, MI with no stent presents today for RHC/LHC. Patient was progressively getting more fatigued for the past several months as well as dyspnea on exertion. Patient had TTE which shows severe MR.  Was seen bu Dr. Manriquez and referred for angiogram. Denies any implanted cardiac device.   TTE from Jan 2023: EF 29%  CONCLUSIONS:  1. Mitral annular calcification with tethered mitral valve  leaflets. Severe mitral regurgitation with an eccentric,  posteriorly directed jet.  2. Moderately dilated left atrium.  LA volume index = 42  cc/m2.  3. Mild left ventricular enlargement.  4. Severe segmental left ventricular systolic dysfunction.  Hypokinesis of the inferior wall, inferolateral wall, basal  to mid anterolateral wall, and basal inferoseptum.  Endocardial visualization enhanced with intravenous  injection of echo contrast (Definity).  No LV thrombus  seen.  5. Moderate diastolic dysfunction (Stage II).  6. Right ventricular enlargement with decreased right  ventricular systolic function.  7. Estimated right ventricular systolic pressure equals 65  mm Hg, assuming right atrial pressure equals 8 mm Hg,  consistent with severe pulmonary hypertension.  8. Normal tricuspid valve.  Moderate-severe tricuspid  regurgitation.  *** No previous Echo exam.   79 y/o Female with PMhx of CHF, HTN, HLD, Severe MR,  RA, Giant cell arteritis, MI with no stent presents today for RHC/LHC. Patient was progressively getting more fatigued for the past several months as well as dyspnea on exertion. Patient had TTE which shows severe MR.  Was seen bu Dr. Manriquez and referred for angiogram. Pt has been evaluated by DR. Steinberg for MR.  Denies any implanted cardiac device.   TTE from Jan 2023: EF 29%  CONCLUSIONS:  1. Mitral annular calcification with tethered mitral valve  leaflets. Severe mitral regurgitation with an eccentric,  posteriorly directed jet.  2. Moderately dilated left atrium.  LA volume index = 42  cc/m2.  3. Mild left ventricular enlargement.  4. Severe segmental left ventricular systolic dysfunction.  Hypokinesis of the inferior wall, inferolateral wall, basal  to mid anterolateral wall, and basal inferoseptum.  Endocardial visualization enhanced with intravenous  injection of echo contrast (Definity).  No LV thrombus  seen.  5. Moderate diastolic dysfunction (Stage II).  6. Right ventricular enlargement with decreased right  ventricular systolic function.  7. Estimated right ventricular systolic pressure equals 65  mm Hg, assuming right atrial pressure equals 8 mm Hg,  consistent with severe pulmonary hypertension.  8. Normal tricuspid valve.  Moderate-severe tricuspid  regurgitation.  *** No previous Echo exam.   79 y/o Female with PMhx of CHF, HTN, HLD, Severe MR,  RA, Giant cell arteritis, MI with no stent presents today for RHC/LHC. Patient was progressively getting more fatigued for the past several months as well as dyspnea on exertion. Had TTE which shows severe MR.  Was seen by Dr. Manriquez and referred for angiogram. Pt has been evaluated by Dr. Steinberg for MR.  Denies any implanted cardiac device.   TTE from Jan 2023: EF 29%  CONCLUSIONS:  1. Mitral annular calcification with tethered mitral valve  leaflets. Severe mitral regurgitation with an eccentric,  posteriorly directed jet.  2. Moderately dilated left atrium.  LA volume index = 42  cc/m2.  3. Mild left ventricular enlargement.  4. Severe segmental left ventricular systolic dysfunction.  Hypokinesis of the inferior wall, inferolateral wall, basal  to mid anterolateral wall, and basal inferoseptum.  Endocardial visualization enhanced with intravenous  injection of echo contrast (Definity).  No LV thrombus  seen.  5. Moderate diastolic dysfunction (Stage II).  6. Right ventricular enlargement with decreased right  ventricular systolic function.  7. Estimated right ventricular systolic pressure equals 65  mm Hg, assuming right atrial pressure equals 8 mm Hg,  consistent with severe pulmonary hypertension.  8. Normal tricuspid valve.  Moderate-severe tricuspid  regurgitation.  *** No previous Echo exam.

## 2023-02-11 LAB
A1C WITH ESTIMATED AVERAGE GLUCOSE RESULT: 5.3 % — SIGNIFICANT CHANGE UP (ref 4–5.6)
ALBUMIN SERPL ELPH-MCNC: 3.6 G/DL — SIGNIFICANT CHANGE UP (ref 3.3–5)
ALBUMIN SERPL ELPH-MCNC: 3.7 G/DL — SIGNIFICANT CHANGE UP (ref 3.3–5)
ALP SERPL-CCNC: 100 U/L — SIGNIFICANT CHANGE UP (ref 40–120)
ALP SERPL-CCNC: 100 U/L — SIGNIFICANT CHANGE UP (ref 40–120)
ALT FLD-CCNC: 30 U/L — SIGNIFICANT CHANGE UP (ref 10–45)
ALT FLD-CCNC: 32 U/L — SIGNIFICANT CHANGE UP (ref 10–45)
ANION GAP SERPL CALC-SCNC: 11 MMOL/L — SIGNIFICANT CHANGE UP (ref 5–17)
ANION GAP SERPL CALC-SCNC: 12 MMOL/L — SIGNIFICANT CHANGE UP (ref 5–17)
ANION GAP SERPL CALC-SCNC: 8 MMOL/L — SIGNIFICANT CHANGE UP (ref 5–17)
APTT BLD: 86 SEC — HIGH (ref 27.5–35.5)
AST SERPL-CCNC: 68 U/L — HIGH (ref 10–40)
AST SERPL-CCNC: 69 U/L — HIGH (ref 10–40)
BILIRUB SERPL-MCNC: 0.6 MG/DL — SIGNIFICANT CHANGE UP (ref 0.2–1.2)
BILIRUB SERPL-MCNC: 0.6 MG/DL — SIGNIFICANT CHANGE UP (ref 0.2–1.2)
BUN SERPL-MCNC: 15 MG/DL — SIGNIFICANT CHANGE UP (ref 7–23)
BUN SERPL-MCNC: 17 MG/DL — SIGNIFICANT CHANGE UP (ref 7–23)
BUN SERPL-MCNC: 18 MG/DL — SIGNIFICANT CHANGE UP (ref 7–23)
CALCIUM SERPL-MCNC: 9 MG/DL — SIGNIFICANT CHANGE UP (ref 8.4–10.5)
CALCIUM SERPL-MCNC: 9 MG/DL — SIGNIFICANT CHANGE UP (ref 8.4–10.5)
CALCIUM SERPL-MCNC: 9.1 MG/DL — SIGNIFICANT CHANGE UP (ref 8.4–10.5)
CHLORIDE SERPL-SCNC: 101 MMOL/L — SIGNIFICANT CHANGE UP (ref 96–108)
CHLORIDE SERPL-SCNC: 102 MMOL/L — SIGNIFICANT CHANGE UP (ref 96–108)
CHLORIDE SERPL-SCNC: 102 MMOL/L — SIGNIFICANT CHANGE UP (ref 96–108)
CHOLEST SERPL-MCNC: 131 MG/DL — SIGNIFICANT CHANGE UP
CK MB BLD-MCNC: 7 % — HIGH (ref 0–3.5)
CK MB CFR SERPL CALC: 24.7 NG/ML — HIGH (ref 0–3.8)
CK SERPL-CCNC: 353 U/L — HIGH (ref 25–170)
CO2 SERPL-SCNC: 21 MMOL/L — LOW (ref 22–31)
CO2 SERPL-SCNC: 22 MMOL/L — SIGNIFICANT CHANGE UP (ref 22–31)
CO2 SERPL-SCNC: 24 MMOL/L — SIGNIFICANT CHANGE UP (ref 22–31)
CREAT SERPL-MCNC: 0.89 MG/DL — SIGNIFICANT CHANGE UP (ref 0.5–1.3)
CREAT SERPL-MCNC: 0.91 MG/DL — SIGNIFICANT CHANGE UP (ref 0.5–1.3)
CREAT SERPL-MCNC: 0.96 MG/DL — SIGNIFICANT CHANGE UP (ref 0.5–1.3)
EGFR: 60 ML/MIN/1.73M2 — SIGNIFICANT CHANGE UP
EGFR: 64 ML/MIN/1.73M2 — SIGNIFICANT CHANGE UP
EGFR: 66 ML/MIN/1.73M2 — SIGNIFICANT CHANGE UP
ESTIMATED AVERAGE GLUCOSE: 105 MG/DL — SIGNIFICANT CHANGE UP (ref 68–114)
GLUCOSE SERPL-MCNC: 103 MG/DL — HIGH (ref 70–99)
GLUCOSE SERPL-MCNC: 96 MG/DL — SIGNIFICANT CHANGE UP (ref 70–99)
GLUCOSE SERPL-MCNC: 97 MG/DL — SIGNIFICANT CHANGE UP (ref 70–99)
HDLC SERPL-MCNC: 50 MG/DL — LOW
LACTATE BLDV-MCNC: 0.5 MMOL/L — SIGNIFICANT CHANGE UP (ref 0.5–2)
LACTATE BLDV-MCNC: 0.6 MMOL/L — SIGNIFICANT CHANGE UP (ref 0.5–2)
LACTATE SERPL-SCNC: 0.9 MMOL/L — SIGNIFICANT CHANGE UP (ref 0.5–2)
LIPID PNL WITH DIRECT LDL SERPL: 73 MG/DL — SIGNIFICANT CHANGE UP
MAGNESIUM SERPL-MCNC: 2 MG/DL — SIGNIFICANT CHANGE UP (ref 1.6–2.6)
NON HDL CHOLESTEROL: 81 MG/DL — SIGNIFICANT CHANGE UP
PHOSPHATE SERPL-MCNC: 3.2 MG/DL — SIGNIFICANT CHANGE UP (ref 2.5–4.5)
POTASSIUM SERPL-MCNC: 4 MMOL/L — SIGNIFICANT CHANGE UP (ref 3.5–5.3)
POTASSIUM SERPL-MCNC: 4.1 MMOL/L — SIGNIFICANT CHANGE UP (ref 3.5–5.3)
POTASSIUM SERPL-MCNC: 4.5 MMOL/L — SIGNIFICANT CHANGE UP (ref 3.5–5.3)
POTASSIUM SERPL-SCNC: 4 MMOL/L — SIGNIFICANT CHANGE UP (ref 3.5–5.3)
POTASSIUM SERPL-SCNC: 4.1 MMOL/L — SIGNIFICANT CHANGE UP (ref 3.5–5.3)
POTASSIUM SERPL-SCNC: 4.5 MMOL/L — SIGNIFICANT CHANGE UP (ref 3.5–5.3)
PROT SERPL-MCNC: 7 G/DL — SIGNIFICANT CHANGE UP (ref 6–8.3)
PROT SERPL-MCNC: 7 G/DL — SIGNIFICANT CHANGE UP (ref 6–8.3)
SODIUM SERPL-SCNC: 133 MMOL/L — LOW (ref 135–145)
SODIUM SERPL-SCNC: 135 MMOL/L — SIGNIFICANT CHANGE UP (ref 135–145)
SODIUM SERPL-SCNC: 135 MMOL/L — SIGNIFICANT CHANGE UP (ref 135–145)
TRIGL SERPL-MCNC: 41 MG/DL — SIGNIFICANT CHANGE UP
TROPONIN T, HIGH SENSITIVITY RESULT: 1140 NG/L — HIGH (ref 0–51)
TSH SERPL-MCNC: 2.17 UIU/ML — SIGNIFICANT CHANGE UP (ref 0.27–4.2)

## 2023-02-11 PROCEDURE — 99232 SBSQ HOSP IP/OBS MODERATE 35: CPT | Mod: 25

## 2023-02-11 PROCEDURE — 99291 CRITICAL CARE FIRST HOUR: CPT

## 2023-02-11 PROCEDURE — 93010 ELECTROCARDIOGRAM REPORT: CPT

## 2023-02-11 PROCEDURE — 71045 X-RAY EXAM CHEST 1 VIEW: CPT | Mod: 26

## 2023-02-11 PROCEDURE — 33968 REMOVE AORTIC ASSIST DEVICE: CPT

## 2023-02-11 RX ORDER — HEPARIN SODIUM 5000 [USP'U]/ML
5000 INJECTION INTRAVENOUS; SUBCUTANEOUS EVERY 8 HOURS
Refills: 0 | Status: DISCONTINUED | OUTPATIENT
Start: 2023-02-11 | End: 2023-02-12

## 2023-02-11 RX ORDER — HEPARIN SODIUM 5000 [USP'U]/ML
650 INJECTION INTRAVENOUS; SUBCUTANEOUS
Qty: 25000 | Refills: 0 | Status: DISCONTINUED | OUTPATIENT
Start: 2023-02-11 | End: 2023-02-11

## 2023-02-11 RX ORDER — HYDRALAZINE HCL 50 MG
10 TABLET ORAL EVERY 8 HOURS
Refills: 0 | Status: DISCONTINUED | OUTPATIENT
Start: 2023-02-11 | End: 2023-02-12

## 2023-02-11 RX ADMIN — Medication 81 MILLIGRAM(S): at 12:14

## 2023-02-11 RX ADMIN — CLOPIDOGREL BISULFATE 75 MILLIGRAM(S): 75 TABLET, FILM COATED ORAL at 12:15

## 2023-02-11 RX ADMIN — HEPARIN SODIUM 5000 UNIT(S): 5000 INJECTION INTRAVENOUS; SUBCUTANEOUS at 15:42

## 2023-02-11 RX ADMIN — Medication 25 MILLIGRAM(S): at 05:19

## 2023-02-11 RX ADMIN — Medication 10 MILLIGRAM(S): at 21:42

## 2023-02-11 RX ADMIN — HEPARIN SODIUM 5000 UNIT(S): 5000 INJECTION INTRAVENOUS; SUBCUTANEOUS at 21:43

## 2023-02-11 RX ADMIN — CHLORHEXIDINE GLUCONATE 1 APPLICATION(S): 213 SOLUTION TOPICAL at 05:19

## 2023-02-11 RX ADMIN — PANTOPRAZOLE SODIUM 40 MILLIGRAM(S): 20 TABLET, DELAYED RELEASE ORAL at 05:19

## 2023-02-11 RX ADMIN — ATORVASTATIN CALCIUM 40 MILLIGRAM(S): 80 TABLET, FILM COATED ORAL at 21:42

## 2023-02-11 RX ADMIN — HEPARIN SODIUM 7 UNIT(S)/HR: 5000 INJECTION INTRAVENOUS; SUBCUTANEOUS at 01:31

## 2023-02-11 NOTE — PROGRESS NOTE ADULT - ASSESSMENT
Assessment/Plan:  HPI:  77 y/o Female with PMhx of CHF, HTN, HLD, Severe MR,  RA, Giant cell arteritis, MI with no stent presents today for RHC/LHC. Patient was progressively getting more fatigued for the past several months as well as dyspnea on exertion. Had TTE which shows severe MR.  Was seen by Dr. Manriquez and referred for angiogram. Pt has been evaluated by Dr. Steinberg for MR.  Admitted to CICU s/p LHC/RHC with TOÑA and POBA to LM w/ IABP placement.        -S/p cardiac cath 2/10: s/p POBA to D1, dLM 70%x 1, pLAD 70% x 1 stents. RFA sheath.   -IABP on LFA for possible removal this am   - groins bilaterally stable   - Cont ASA, Plavix and Heparin gtt as per CICU team   - DAPT for 1 year   -TTE 1/17 showed: EF of 29, severe MR, Moderately dilated left atrium. Mild left ventricular enlargement. Severe segmental left ventricular systolic dysfunction. No LV thrombus  seen. Moderate diastolic dysfunction (Stage II). Right ventricular enlargement with decreased right ventricular systolic function. severe pulmonary hypertension. Moderate-severe tricuspid  regurgitation.  - Cont Atorvastatin 40  - Cont Metroprolol ( with hold parameters)   - Lasix 20,  Entresto on hold  for now   - chronic anemia with baseline Hgb of 9's, current H/H 9.3/29., morning labs pending   - Keep Mg >2 K>4   - cont to monitor on tele  - all other care as per primary/ CICU team    Remy Hardy NP  invasive cardiology     Please check [email or web address] password [email or web address] for cardiology service schedule and contact information via TEAMS

## 2023-02-11 NOTE — PROGRESS NOTE ADULT - CRITICAL CARE ATTENDING COMMENT
77 yo woman with severe MR presented for elective LHC found to have significant LM ds and evidence of thrombus LAD s/p PCI and IABP placement, transferred to CICU for further management    A+Ox3  Hemodynamics acceptable, no pressors or inotropes  on IABP post PCI due to transient hypotension and instability, tolerated wean overnight and IABP was dc'd in am    for now will hold entresto, if BP increases consider hydral   cont BB   O2 sats mid to high 90s on room air  DASH diet  Normal renal function  H/H low but acceptable off heparin drip, will start DVT with SQH   Afebrile, no antibiotics  Sugars controlled  IABP 2/10-2/11

## 2023-02-11 NOTE — PROGRESS NOTE ADULT - SUBJECTIVE AND OBJECTIVE BOX
CAROL LOPEZ  MRN-[id number]  Patient is a 78y old  Female who presents with a chief complaint of Severe MR (11 Feb 2023 07:31)    HPI:  77 y/o Female with PMhx of CHF, HTN, HLD, Severe MR,  RA, Giant cell arteritis, MI with no stent presents today for RHC/LHC. Patient was progressively getting more fatigued for the past several months as well as dyspnea on exertion. Had TTE which shows severe MR.  Was seen by Dr. Manriquez and referred for angiogram. Pt has been evaluated by Dr. Steinberg for MR.  Denies any implanted cardiac device.   TTE from Jan 2023: EF 29%  CONCLUSIONS:  1. Mitral annular calcification with tethered mitral valve  leaflets. Severe mitral regurgitation with an eccentric,  posteriorly directed jet.  2. Moderately dilated left atrium.  LA volume index = 42  cc/m2.  3. Mild left ventricular enlargement.  4. Severe segmental left ventricular systolic dysfunction.  Hypokinesis of the inferior wall, inferolateral wall, basal  to mid anterolateral wall, and basal inferoseptum.  Endocardial visualization enhanced with intravenous  injection of echo contrast (Definity).  No LV thrombus  seen.  5. Moderate diastolic dysfunction (Stage II).  6. Right ventricular enlargement with decreased right  ventricular systolic function.  7. Estimated right ventricular systolic pressure equals 65  mm Hg, assuming right atrial pressure equals 8 mm Hg,  consistent with severe pulmonary hypertension.  8. Normal tricuspid valve.  Moderate-severe tricuspid  regurgitation.  *** No previous Echo exam.   (10 Feb 2023 07:02)      Hospital Course:    24 HOUR EVENTS:    REVIEW OF SYSTEMS:    CONSTITUTIONAL: No weakness, fevers or chills  EYES/ENT: No visual changes;  No vertigo or throat pain   NECK: No pain or stiffness  RESPIRATORY: No cough, wheezing, hemoptysis; No shortness of breath  CARDIOVASCULAR: No chest pain or palpitations  GASTROINTESTINAL: No abdominal or epigastric pain. No nausea, vomiting, or hematemesis; No diarrhea or constipation. No melena or hematochezia.  GENITOURINARY: No dysuria, frequency or hematuria  NEUROLOGICAL: No numbness or weakness  SKIN: No itching, rashes      ICU Vital Signs Last 24 Hrs  T(C): 36.7 (11 Feb 2023 19:00), Max: 37.1 (11 Feb 2023 05:00)  T(F): 98.1 (11 Feb 2023 19:00), Max: 98.8 (11 Feb 2023 05:00)  HR: 78 (11 Feb 2023 20:00) (64 - 92)  BP: 123/60 (11 Feb 2023 20:00) (92/55 - 134/63)  BP(mean): 86 (11 Feb 2023 20:00) (70 - 92)  ABP: --  ABP(mean): --  RR: 20 (11 Feb 2023 20:00) (12 - 25)  SpO2: 100% (11 Feb 2023 20:00) (97% - 100%)    O2 Parameters below as of 11 Feb 2023 20:00  Patient On (Oxygen Delivery Method): room air            CVP(mm Hg): --  CO: --  CI: --  PA: --  PA(mean): --  PA(direct): --  PCWP: --  LA: --  RA: --  SVR: --  SVRI: --  PVR: --  PVRI: --  I&O's Summary    10 Feb 2023 07:01  -  11 Feb 2023 07:00  --------------------------------------------------------  IN: 847 mL / OUT: 1160 mL / NET: -313 mL    11 Feb 2023 07:01  -  11 Feb 2023 20:34  --------------------------------------------------------  IN: 480 mL / OUT: 900 mL / NET: -420 mL        CAPILLARY BLOOD GLUCOSE    CAPILLARY BLOOD GLUCOSE      POCT Blood Glucose.: 100 mg/dL (10 Feb 2023 06:54)      PHYSICAL EXAM:  GENERAL: No acute distress, well-developed  HEAD:  Atraumatic, Normocephalic  EYES: EOMI, PERRLA, conjunctiva and sclera clear  NECK: Supple, no lymphadenopathy, no JVD  CHEST/LUNG: CTAB; No wheezes, rales, or rhonchi  HEART: Regular rate and rhythm. Normal S1/S2. No murmurs, rubs, or gallops  ABDOMEN: Soft, non-tender, non-distended; normal bowel sounds, no organomegaly  EXTREMITIES:  2+ peripheral pulses b/l, No clubbing, cyanosis, or edema  NEUROLOGY: A&O x 3, no focal deficits  SKIN: No rashes or lesions    ============================I/O===========================   I&O's Detail    10 Feb 2023 07:01  -  11 Feb 2023 07:00  --------------------------------------------------------  IN:    Heparin: 34.5 mL    Heparin Infusion: 56 mL    Heparin Infusion: 6.5 mL    Oral Fluid: 750 mL  Total IN: 847 mL    OUT:    Voided (mL): 1160 mL  Total OUT: 1160 mL    Total NET: -313 mL      11 Feb 2023 07:01  -  11 Feb 2023 20:34  --------------------------------------------------------  IN:    Oral Fluid: 480 mL  Total IN: 480 mL    OUT:    Voided (mL): 900 mL  Total OUT: 900 mL    Total NET: -420 mL        ============================ LABS =========================                        9.3    4.88  )-----------( 202      ( 10 Feb 2023 22:25 )             29.6     02-11    133<L>  |  101  |  15  ----------------------------<  103<H>  4.5   |  24  |  0.96    Ca    9.0      11 Feb 2023 15:00  Phos  3.2     02-11  Mg     2.0     02-11    TPro  7.0  /  Alb  3.6  /  TBili  0.6  /  DBili  x   /  AST  68<H>  /  ALT  30  /  AlkPhos  100  02-11    Troponin T, High Sensitivity Result: 1140 ng/L (02-11-23 @ 05:24)  Troponin T, High Sensitivity Result: 1212 ng/L (02-10-23 @ 22:25)    CKMB Units: 24.7 ng/mL (02-11-23 @ 05:24)  CKMB Units: 32.7 ng/mL (02-10-23 @ 22:25)    Creatine Kinase, Serum: 353 U/L (02-11-23 @ 05:24)  Creatine Kinase, Serum: 367 U/L (02-10-23 @ 22:25)    CPK Mass Assay %: 7.0 % (02-11-23 @ 05:24)  CPK Mass Assay %: 8.9 % (02-10-23 @ 22:25)        LIVER FUNCTIONS - ( 11 Feb 2023 05:24 )  Alb: 3.6 g/dL / Pro: 7.0 g/dL / ALK PHOS: 100 U/L / ALT: 30 U/L / AST: 68 U/L / GGT: x           PT/INR - ( 10 Feb 2023 22:25 )   PT: 13.3 sec;   INR: 1.15 ratio         PTT - ( 11 Feb 2023 05:24 )  PTT:86.0 sec    Lactate, Blood: 0.9 mmol/L (02-11-23 @ 15:00)  Blood Gas Venous - Lactate: 0.6 mmol/L (02-11-23 @ 05:13)  Blood Gas Venous - Lactate: 0.5 mmol/L (02-11-23 @ 02:18)  Blood Gas Venous - Lactate: 0.7 mmol/L (02-10-23 @ 22:25)      ======================Micro/Rad/Cardio=================  Telemtry: Reviewed   EKG: Reviewed  CXR: Reviewed  Culture: Reviewed   Echo:   Cath:   ======================================================  PAST MEDICAL & SURGICAL HISTORY:  H/O CHF      Benign essential HTN      HLD (hyperlipidemia)      DM (diabetes mellitus)      Rheumatoid arthritis      Giant cell arteritis  ====================ASSESSMENT ==============  77 y/o Female with PMhx of CHF, HTN, HLD, Severe MR,  RA, Giant cell arteritis, MI 2021 with no stents placed admitted to CICU s/p LHC/RHC with TOÑA and POBA to LM w/ IABP placement.   ===================================================================  NEURO:  -AxO x3   - Patient has poor vision R < L 2/2 GCA (dx 8/2019) and b/l cataracts   - no active issues     RESPIRATORY:   - satting well on RA  - no active issues     CARDIOVASCULAR:  #HFrEF   - s/p cardiac cath 2/10: s/p POBA to D1, dLM 70%x 1, pLAD 70% x 1 stents. RFA sheath. IABP on LFA to increase coronary perfusion i/s/o large clot burden in LCX and Diag. ASA, Plavix loaded. Heparin started in CICU  -TTE 1/17 showed: EF of 29, severe MR, Moderately dilated left atrium. Mild left ventricular enlargement. Severe segmental left ventricular systolic dysfunction. No LV thrombus  seen. Moderate diastolic dysfunction (Stage II). Right ventricular enlargement with decreased right ventricular systolic function. severe pulmonary hypertension. Moderate-severe tricuspid  regurgitation.  - ASA 81, Plavix 75, Atorvastatin 40, metroprolol succinate 25,  - Holding home entresto for now, can consider starting low dose hydralazine if needed for BP control  - defer home Lasix for now, will resume if UO drops off or pt becomes net positive  - heparin gtt held for iabp removal this AM  - on Farxiga 5 at home, currently held       GI/NUTRITION:  - DASH/TLC diet   -protonix 40 for GI ppx   - no active issues       GENITOURINARY/RENAL:  -no active issues    HEMATOLOGIC:  -monitor H/H. Patient has chronic anemia with baseline Hgb of 9's     -DVT ppx: On systemic heparin gtt for IABP circuit, once removed will start HSQ    INFECTIOUS DISEASE:  -no active issues     ENDOCRINE:  -Monitor glucose, currently stable       Patient requires continuous monitoring with bedside rhythm monitoring, pulse ox monitoring, and intermittent blood gas analysis. Care plan discussed with ICU care team. Patient remained critical and at risk for life threatening decompensation.  Patient seen, examined and plan discussed with CCU team during rounds.     I have personally provided ____ minutes of critical care time excluding time spent on separate procedures, in addition to initial critical care time provided by the CICU Attending, Dr. Liu.    By signing my name below, I, Damari Gill, attest that this documentation has been prepared under the direction and in the presence of MIREYA Benitez.    Electronically signed: Shailesh Tompkins, 02-11-23 @ 20:34    I, Nikki Lee, personally performed the services described in this documentation. all medical record entries made by the shailesh were at my direction and in my presence. I have reviewed the chart and agree that the record reflects my personal performance and is accurate and complete  Electronically signed: MIREYA Benitez.       CAROL LOPEZ  MRN-[id number]  Patient is a 78y old  Female who presents with a chief complaint of Severe MR (11 Feb 2023 07:31)    HPI:  77 y/o Female with PMhx of CHF, HTN, HLD, Severe MR,  RA, Giant cell arteritis, MI with no stent presents today for RHC/LHC. Patient was progressively getting more fatigued for the past several months as well as dyspnea on exertion. Had TTE which shows severe MR.  Was seen by Dr. Manriquez and referred for angiogram. Pt has been evaluated by Dr. Steinberg for MR.  Denies any implanted cardiac device.   TTE from Jan 2023: EF 29%  CONCLUSIONS:  1. Mitral annular calcification with tethered mitral valve  leaflets. Severe mitral regurgitation with an eccentric,  posteriorly directed jet.  2. Moderately dilated left atrium.  LA volume index = 42  cc/m2.  3. Mild left ventricular enlargement.  4. Severe segmental left ventricular systolic dysfunction.  Hypokinesis of the inferior wall, inferolateral wall, basal  to mid anterolateral wall, and basal inferoseptum.  Endocardial visualization enhanced with intravenous  injection of echo contrast (Definity).  No LV thrombus  seen.  5. Moderate diastolic dysfunction (Stage II).  6. Right ventricular enlargement with decreased right  ventricular systolic function.  7. Estimated right ventricular systolic pressure equals 65  mm Hg, assuming right atrial pressure equals 8 mm Hg,  consistent with severe pulmonary hypertension.  8. Normal tricuspid valve.  Moderate-severe tricuspid  regurgitation.  *** No previous Echo exam.   (10 Feb 2023 07:02)        24 HOUR EVENTS:  IABP removed in AM  started on SQH  OOBTC  started on hydral 10 tid qHS  REVIEW OF SYSTEMS:    CONSTITUTIONAL: No weakness, fevers or chills  EYES/ENT: No visual changes;  No vertigo or throat pain   NECK: No pain or stiffness  RESPIRATORY: No cough, wheezing, hemoptysis; No shortness of breath  CARDIOVASCULAR: No chest pain or palpitations  GASTROINTESTINAL: No abdominal or epigastric pain. No nausea, vomiting, or hematemesis; No diarrhea or constipation. No melena or hematochezia.  GENITOURINARY: No dysuria, frequency or hematuria  NEUROLOGICAL: No numbness or weakness  SKIN: No itching, rashes      ICU Vital Signs Last 24 Hrs  T(C): 36.7 (11 Feb 2023 19:00), Max: 37.1 (11 Feb 2023 05:00)  T(F): 98.1 (11 Feb 2023 19:00), Max: 98.8 (11 Feb 2023 05:00)  HR: 78 (11 Feb 2023 20:00) (64 - 92)  BP: 123/60 (11 Feb 2023 20:00) (92/55 - 134/63)  BP(mean): 86 (11 Feb 2023 20:00) (70 - 92)  RR: 20 (11 Feb 2023 20:00) (12 - 25)  SpO2: 100% (11 Feb 2023 20:00) (97% - 100%)    O2 Parameters below as of 11 Feb 2023 20:00  Patient On (Oxygen Delivery Method): room air          I&O's Summary    10 Feb 2023 07:01  -  11 Feb 2023 07:00  --------------------------------------------------------  IN: 847 mL / OUT: 1160 mL / NET: -313 mL    11 Feb 2023 07:01  -  11 Feb 2023 20:34  --------------------------------------------------------  IN: 480 mL / OUT: 900 mL / NET: -420 mL        CAPILLARY BLOOD GLUCOSE    CAPILLARY BLOOD GLUCOSE      POCT Blood Glucose.: 100 mg/dL (10 Feb 2023 06:54)          ============================I/O===========================   I&O's Detail    10 Feb 2023 07:01  -  11 Feb 2023 07:00  --------------------------------------------------------  IN:    Heparin: 34.5 mL    Heparin Infusion: 56 mL    Heparin Infusion: 6.5 mL    Oral Fluid: 750 mL  Total IN: 847 mL    OUT:    Voided (mL): 1160 mL  Total OUT: 1160 mL    Total NET: -313 mL      11 Feb 2023 07:01  -  11 Feb 2023 20:34  --------------------------------------------------------  IN:    Oral Fluid: 480 mL  Total IN: 480 mL    OUT:    Voided (mL): 900 mL  Total OUT: 900 mL    Total NET: -420 mL        ============================ LABS =========================                        9.3    4.88  )-----------( 202      ( 10 Feb 2023 22:25 )             29.6     02-11    133<L>  |  101  |  15  ----------------------------<  103<H>  4.5   |  24  |  0.96    Ca    9.0      11 Feb 2023 15:00  Phos  3.2     02-11  Mg     2.0     02-11    TPro  7.0  /  Alb  3.6  /  TBili  0.6  /  DBili  x   /  AST  68<H>  /  ALT  30  /  AlkPhos  100  02-11    Troponin T, High Sensitivity Result: 1140 ng/L (02-11-23 @ 05:24)  Troponin T, High Sensitivity Result: 1212 ng/L (02-10-23 @ 22:25)    CKMB Units: 24.7 ng/mL (02-11-23 @ 05:24)  CKMB Units: 32.7 ng/mL (02-10-23 @ 22:25)    Creatine Kinase, Serum: 353 U/L (02-11-23 @ 05:24)  Creatine Kinase, Serum: 367 U/L (02-10-23 @ 22:25)    CPK Mass Assay %: 7.0 % (02-11-23 @ 05:24)  CPK Mass Assay %: 8.9 % (02-10-23 @ 22:25)        LIVER FUNCTIONS - ( 11 Feb 2023 05:24 )  Alb: 3.6 g/dL / Pro: 7.0 g/dL / ALK PHOS: 100 U/L / ALT: 30 U/L / AST: 68 U/L / GGT: x           PT/INR - ( 10 Feb 2023 22:25 )   PT: 13.3 sec;   INR: 1.15 ratio         PTT - ( 11 Feb 2023 05:24 )  PTT:86.0 sec    Lactate, Blood: 0.9 mmol/L (02-11-23 @ 15:00)  Blood Gas Venous - Lactate: 0.6 mmol/L (02-11-23 @ 05:13)  Blood Gas Venous - Lactate: 0.5 mmol/L (02-11-23 @ 02:18)  Blood Gas Venous - Lactate: 0.7 mmol/L (02-10-23 @ 22:25)      ======================Micro/Rad/Cardio=================  Telemtry: Reviewed   EKG: Reviewed  CXR: Reviewed  Culture: Reviewed   Echo:   Cath:   ======================================================  PAST MEDICAL & SURGICAL HISTORY:  H/O CHF      Benign essential HTN      HLD (hyperlipidemia)      DM (diabetes mellitus)      Rheumatoid arthritis      Giant cell arteritis      ====================ASSESSMENT ==============  77 y/o Female with PMhx of CHF, HTN, HLD, Severe MR,  RA, Giant cell arteritis, MI 2021 with no stents placed admitted to CICU s/p LHC/RHC with TOÑA and POBA to LM w/ IABP placement.   ===================================================================  NEURO:  A&O x3   - Patient has poor vision R < L 2/2 GCA (dx 8/2019) and b/l cataracts   - continue to monitor mental status per protocol   - OOBTC    RESPIRATORY:   Comfortable on RA  - continue to monitor SpO2 with goal >94%      CARDIOVASCULAR:  HFrEF   - s/p cardiac cath 2/10: s/p POBA to D1, dLM 70%x 1, pLAD 70% x 1 stents. RFA sheath. IABP on LFA to increase coronary perfusion i/s/o large clot burden in LCX and Diag. ASA, Plavix loaded  - c/w maintenance DAPT  - s/p IABP removal 2/11 without complication, post removal lactate remained neg with stable perfusion indices    -TTE 1/17 showed: EF of 29, severe MR, Moderately dilated left atrium. Mild left ventricular enlargement. Severe segmental left ventricular systolic dysfunction. No LV thrombus  seen. Moderate diastolic dysfunction (Stage II). Right ventricular enlargement with decreased right ventricular systolic function. severe pulmonary hypertension. Moderate-severe tricuspid regurgitation.  - c/w Atorvastatin 40  - c/w metroprolol succinate 25,  - Hold entresto for now as SBP 110s-130s   - start Hydral 10 tid for AL reduction   - on Farxiga 5 at home, currently held   - holding home lasix. Currently euvolemic and currently maintaing net negative without diuresis. continue to monitor fluid status daily       GI/NUTRITION:  - DASH/TLC diet   -protonix 40 for GERD  - monitor for regular BM while inpatient       GENITOURINARY/RENAL:  SCr wnl  - continue to monitor and trend SCr, BUN. lytes, and I&Os  - replete lytes prn with goal K 4-4.5 and Mg >2     HEMATOLOGIC:  Normocytic anemia   -monitor H/H. Patient has chronic anemia with baseline Hgb of 9's     -DVT ppx: SQH     INFECTIOUS DISEASE:  Afebrile, no leukocytosis  - Monitor and trend wbc and temp curve     ENDOCRINE:  -Monitor glucose, currently stable. Goal 100-180       Patient requires continuous monitoring with bedside rhythm monitoring, pulse ox monitoring, and intermittent blood gas analysis. Care plan discussed with ICU care team. Patient remained critical and at risk for life threatening decompensation.  Patient seen, examined and plan discussed with CCU team during rounds.     I have personally provided __40__ minutes of critical care time excluding time spent on separate procedures, in addition to initial critical care time provided by the CICU Attending, Dr. Liu.    By signing my name below, I, Damari Gill, attest that this documentation has been prepared under the direction and in the presence of MIREYA Benitez.    Electronically signed: Dar Tompkins, 02-11-23 @ 20:34    I, Nikki Lee, personally performed the services described in this documentation. all medical record entries made by the olyaibchacha were at my direction and in my presence. I have reviewed the chart and agree that the record reflects my personal performance and is accurate and complete  Electronically signed: MIREYA Benitez.

## 2023-02-11 NOTE — PROGRESS NOTE ADULT - SUBJECTIVE AND OBJECTIVE BOX
CAROL LOPEZ  MRN-[id number]  Patient is a 78y old  Female who presents with a chief complaint of   HPI:  79 y/o Female with PMhx of CHF, HTN, HLD, Severe MR,  RA, Giant cell arteritis, MI with no stent presents today for RHC/LHC. Patient was progressively getting more fatigued for the past several months as well as dyspnea on exertion. Had TTE which shows severe MR.  Was seen by Dr. Manriquez and referred for angiogram. Pt has been evaluated by Dr. Steinberg for MR.  Denies any implanted cardiac device.   TTE from Jan 2023: EF 29%  CONCLUSIONS:  1. Mitral annular calcification with tethered mitral valve  leaflets. Severe mitral regurgitation with an eccentric,  posteriorly directed jet.  2. Moderately dilated left atrium.  LA volume index = 42  cc/m2.  3. Mild left ventricular enlargement.  4. Severe segmental left ventricular systolic dysfunction.  Hypokinesis of the inferior wall, inferolateral wall, basal  to mid anterolateral wall, and basal inferoseptum.  Endocardial visualization enhanced with intravenous  injection of echo contrast (Definity).  No LV thrombus  seen.  5. Moderate diastolic dysfunction (Stage II).  6. Right ventricular enlargement with decreased right  ventricular systolic function.  7. Estimated right ventricular systolic pressure equals 65  mm Hg, assuming right atrial pressure equals 8 mm Hg,  consistent with severe pulmonary hypertension.  8. Normal tricuspid valve.  Moderate-severe tricuspid  regurgitation.  *** No previous Echo exam.   (10 Feb 2023 07:02)      Overnight events: IABP weaned and heparin gtt held this AM.     REVIEW OF SYSTEMS:    CONSTITUTIONAL: No weakness, fevers or chills  EYES/ENT: No visual changes;  No vertigo or throat pain   NECK: No pain or stiffness  RESPIRATORY: No cough, wheezing, hemoptysis; No shortness of breath  CARDIOVASCULAR: No chest pain or palpitations  GASTROINTESTINAL: No abdominal or epigastric pain. No nausea, vomiting, or hematemesis; No diarrhea or constipation. No melena or hematochezia.  GENITOURINARY: No dysuria, frequency or hematuria  NEUROLOGICAL: No numbness or weakness  SKIN: No itching, rashes      Physical Exam:  Vital Signs Last 24 Hrs  T(C): 37.1 (11 Feb 2023 05:00), Max: 37.1 (11 Feb 2023 05:00)  T(F): 98.8 (11 Feb 2023 05:00), Max: 98.8 (11 Feb 2023 05:00)  HR: 68 (11 Feb 2023 06:00) (62 - 78)  BP: 128/59 (11 Feb 2023 06:00) (81/52 - 130/62)  BP(mean): 85 (11 Feb 2023 06:00) (62 - 93)  RR: 18 (11 Feb 2023 06:00) (9 - 33)  SpO2: 100% (11 Feb 2023 06:00) (93% - 100%)    Parameters below as of 11 Feb 2023 03:00  Patient On (Oxygen Delivery Method): room air      Physical Exam:   Constitutional: NAD, well-groomed, well-developed  HEENT: PERRLA, EOMI, no drainage or redness  Neck: supple,  No JVD  Respiratory: Breath Sounds equal & clear bilaterally to auscultation, no rales/rhonchi/wheezing, no accessory muscle use noted  Cardiovascular: Regular rate, regular rhythm, normal S1, S2; no murmurs or rub  Gastrointestinal: Soft, non-tender, non distended, + bowel sounds  Extremities: ROJAS x 4, no peripheral edema, no cyanosis, no clubbing   Neurological: A+O x 3; speech clear and intact; no sensory, motor  deficits, normal reflexes  Skin: warm, dry, well perfused    ============================I/O===========================   I&O's Detail    10 Feb 2023 07:01  -  11 Feb 2023 07:00  --------------------------------------------------------  IN:    Heparin: 34.5 mL    Heparin Infusion: 56 mL    Heparin Infusion: 6.5 mL    Oral Fluid: 750 mL  Total IN: 847 mL    OUT:    Voided (mL): 1160 mL  Total OUT: 1160 mL    Total NET: -313 mL        ============================ LABS =========================                        9.3    4.88  )-----------( 202      ( 10 Feb 2023 22:25 )             29.6     02-11    135  |  102  |  17  ----------------------------<  96  4.1   |  22  |  0.89    Ca    9.0      11 Feb 2023 05:24  Phos  3.2     02-11  Mg     2.0     02-11    TPro  7.0  /  Alb  3.6  /  TBili  0.6  /  DBili  x   /  AST  68<H>  /  ALT  30  /  AlkPhos  100  02-11    LIVER FUNCTIONS - ( 11 Feb 2023 05:24 )  Alb: 3.6 g/dL / Pro: 7.0 g/dL / ALK PHOS: 100 U/L / ALT: 30 U/L / AST: 68 U/L / GGT: x           PT/INR - ( 10 Feb 2023 22:25 )   PT: 13.3 sec;   INR: 1.15 ratio         PTT - ( 11 Feb 2023 05:24 )  PTT:86.0 sec      ======================Micro/Rad/Cardio=================  Culture: Reviewed   CXR: Reviewed  Echo:Reviewed  ======================================================  PAST MEDICAL & SURGICAL HISTORY:  H/O CHF      Benign essential HTN      HLD (hyperlipidemia)      DM (diabetes mellitus)      Rheumatoid arthritis      Giant cell arteritis       CAROL LOPEZ  MRN-[id number]  Patient is a 78y old  Female who presents with a chief complaint of   HPI:  77 y/o Female with PMhx of CHF, HTN, HLD, Severe MR,  RA, Giant cell arteritis, MI with no stent presents today for RHC/LHC. Patient was progressively getting more fatigued for the past several months as well as dyspnea on exertion. Had TTE which shows severe MR.  Was seen by Dr. Manriquez and referred for angiogram. Pt has been evaluated by Dr. Steinberg for MR.  Denies any implanted cardiac device.   TTE from Jan 2023: EF 29%  CONCLUSIONS:  1. Mitral annular calcification with tethered mitral valve  leaflets. Severe mitral regurgitation with an eccentric,  posteriorly directed jet.  2. Moderately dilated left atrium.  LA volume index = 42  cc/m2.  3. Mild left ventricular enlargement.  4. Severe segmental left ventricular systolic dysfunction.  Hypokinesis of the inferior wall, inferolateral wall, basal  to mid anterolateral wall, and basal inferoseptum.  Endocardial visualization enhanced with intravenous  injection of echo contrast (Definity).  No LV thrombus  seen.  5. Moderate diastolic dysfunction (Stage II).  6. Right ventricular enlargement with decreased right  ventricular systolic function.  7. Estimated right ventricular systolic pressure equals 65  mm Hg, assuming right atrial pressure equals 8 mm Hg,  consistent with severe pulmonary hypertension.  8. Normal tricuspid valve.  Moderate-severe tricuspid  regurgitation.  *** No previous Echo exam.   (10 Feb 2023 07:02)      Overnight events: IABP weaned and heparin gtt held this AM.     REVIEW OF SYSTEMS:    CONSTITUTIONAL: No weakness, fevers or chills  EYES/ENT: No vertigo or throat pain   NECK: No pain or stiffness  RESPIRATORY: No cough, wheezing, hemoptysis; No shortness of breath  CARDIOVASCULAR: No chest pain or palpitations  GASTROINTESTINAL: No abdominal or epigastric pain. No nausea, vomiting, or hematemesis; No diarrhea or constipation. No melena or hematochezia.  GENITOURINARY: No dysuria, frequency or hematuria  NEUROLOGICAL: No numbness or weakness  SKIN: No itching, rashes      Physical Exam:  Vital Signs Last 24 Hrs  T(C): 37.1 (11 Feb 2023 05:00), Max: 37.1 (11 Feb 2023 05:00)  T(F): 98.8 (11 Feb 2023 05:00), Max: 98.8 (11 Feb 2023 05:00)  HR: 68 (11 Feb 2023 06:00) (62 - 78)  BP: 128/59 (11 Feb 2023 06:00) (81/52 - 130/62)  BP(mean): 85 (11 Feb 2023 06:00) (62 - 93)  RR: 18 (11 Feb 2023 06:00) (9 - 33)  SpO2: 100% (11 Feb 2023 06:00) (93% - 100%)    Parameters below as of 11 Feb 2023 03:00  Patient On (Oxygen Delivery Method): room air      Physical Exam:   Constitutional: NAD, well-groomed, well-developed  HEENT: EOMI, no drainage or redness  Neck: supple,  No JVD  Respiratory: Breath Sounds equal & clear bilaterally to auscultation, no rales/rhonchi/wheezing, no accessory muscle use noted  Cardiovascular: Regular rate, regular rhythm, normal S1, S2; no murmurs or rub  Gastrointestinal: Soft, non-tender, non distended, + bowel sounds  Extremities: ROJAS x 4, no peripheral edema, no cyanosis, no clubbing   Neurological: A+O x 3; speech clear and intact; no sensory, motor  deficits, normal reflexes  Skin: warm, dry, well perfused  R groin no hematoma, no bruit, L groin no hematoma     ============================I/O===========================   I&O's Detail    10 Feb 2023 07:01  -  11 Feb 2023 07:00  --------------------------------------------------------  IN:    Heparin: 34.5 mL    Heparin Infusion: 56 mL    Heparin Infusion: 6.5 mL    Oral Fluid: 750 mL  Total IN: 847 mL    OUT:    Voided (mL): 1160 mL  Total OUT: 1160 mL    Total NET: -313 mL        ============================ LABS =========================                        9.3    4.88  )-----------( 202      ( 10 Feb 2023 22:25 )             29.6     02-11    135  |  102  |  17  ----------------------------<  96  4.1   |  22  |  0.89    Ca    9.0      11 Feb 2023 05:24  Phos  3.2     02-11  Mg     2.0     02-11    TPro  7.0  /  Alb  3.6  /  TBili  0.6  /  DBili  x   /  AST  68<H>  /  ALT  30  /  AlkPhos  100  02-11    LIVER FUNCTIONS - ( 11 Feb 2023 05:24 )  Alb: 3.6 g/dL / Pro: 7.0 g/dL / ALK PHOS: 100 U/L / ALT: 30 U/L / AST: 68 U/L / GGT: x           PT/INR - ( 10 Feb 2023 22:25 )   PT: 13.3 sec;   INR: 1.15 ratio         PTT - ( 11 Feb 2023 05:24 )  PTT:86.0 sec      ======================Micro/Rad/Cardio=================  Culture: Reviewed   CXR: Reviewed  Echo:Reviewed  ======================================================  PAST MEDICAL & SURGICAL HISTORY:  H/O CHF      Benign essential HTN      HLD (hyperlipidemia)      DM (diabetes mellitus)      Rheumatoid arthritis      Giant cell arteritis

## 2023-02-11 NOTE — PROGRESS NOTE ADULT - ASSESSMENT
77 y/o Female with PMhx of CHF, HTN, HLD, Severe MR,  RA, Giant cell arteritis, MI 2021 with no stents placed admitted to CICU s/p LHC/RHC with TOÑA and POBA to LM w/ IABP placement.     NEURO:  -AxO x3   - Patient has poor vision R < L 2/2 GCA (dx 8/2019) and b/l cataracts   - no active issues     RESPIRATORY:   - satting well on RA  - no active issues     CARDIOVASCULAR:  #HFrEF   - s/p cardiac cath 2/10: s/p POBA to D1, dLM 70%x 1, pLAD 70% x 1 stents. RFA sheath. IABP on LFA to increase coronary perfusion i/s/o large clot burden in LCX and Diag. ASA, Plavix loaded. Heparin started in CICU  -TTE 1/17 showed: EF of 29, severe MR, Moderately dilated left atrium. Mild left ventricular enlargement. Severe segmental left ventricular systolic dysfunction. No LV thrombus  seen. Moderate diastolic dysfunction (Stage II). Right ventricular enlargement with decreased right ventricular systolic function. severe pulmonary hypertension. Moderate-severe tricuspid  regurgitation.  - ASA 81, Plavix 75, Atorvastatin 40, metroprolol succinate 25,  - Lasix 20, hold entresto for now   - heparin gtt held for iabp removal this AM  - on Farxiga 5 at home, currently held       GI/NUTRITION:  - DASH/TLC diet   -protonix 40 for GI ppx   - no active issues       GENITOURINARY/RENAL:  -no active issues    HEMATOLOGIC:  -monitor H/H. Patient has chronic anemia with baseline Hgb of 9's     -DVT ppx: Patient specific heparin gtt per ACS protocol normogram, target aPTT 53-73    INFECTIOUS DISEASE:  -no active issues     ENDOCRINE:  -Monitor glucose, currently stable     Lines:   - LF IABP 2/10-   79 y/o Female with PMhx of CHF, HTN, HLD, Severe MR,  RA, Giant cell arteritis, MI 2021 with no stents placed admitted to CICU s/p LHC/RHC with TOÑA and POBA to LM w/ IABP placement.     NEURO:  -AxO x3   - Patient has poor vision R < L 2/2 GCA (dx 8/2019) and b/l cataracts   - no active issues     RESPIRATORY:   - satting well on RA  - no active issues     CARDIOVASCULAR:  #HFrEF   - s/p cardiac cath 2/10: s/p POBA to D1, dLM 70%x 1, pLAD 70% x 1 stents. RFA sheath. IABP on LFA to increase coronary perfusion i/s/o large clot burden in LCX and Diag. ASA, Plavix loaded. Heparin started in CICU  -TTE 1/17 showed: EF of 29, severe MR, Moderately dilated left atrium. Mild left ventricular enlargement. Severe segmental left ventricular systolic dysfunction. No LV thrombus  seen. Moderate diastolic dysfunction (Stage II). Right ventricular enlargement with decreased right ventricular systolic function. severe pulmonary hypertension. Moderate-severe tricuspid  regurgitation.  - ASA 81, Plavix 75, Atorvastatin 40, metroprolol succinate 25,  - Holding home entresto for now, can consider starting low dose hydralazine if needed for BP control  - defer home Lasix for now, will resume if UO drops off or pt becomes net positive  - heparin gtt held for iabp removal this AM  - on Farxiga 5 at home, currently held       GI/NUTRITION:  - DASH/TLC diet   -protonix 40 for GI ppx   - no active issues       GENITOURINARY/RENAL:  -no active issues    HEMATOLOGIC:  -monitor H/H. Patient has chronic anemia with baseline Hgb of 9's     -DVT ppx: On systemic heparin gtt for IABP circuit, once removed will start HSQ    INFECTIOUS DISEASE:  -no active issues     ENDOCRINE:  -Monitor glucose, currently stable     Lines:   - LF IABP 2/10-   77 y/o Female with PMhx of CHF, HTN, HLD, Severe MR,  RA, Giant cell arteritis, MI 2021 with no stents placed admitted to CICU s/p LHC/RHC with TOÑA and POBA to LM w/ IABP placement.     NEURO:  -AxO x3   - Patient has poor vision R < L 2/2 GCA (dx 8/2019) and b/l cataracts   - no active issues     RESPIRATORY:   - satting well on RA  - no active issues     CARDIOVASCULAR:  #HFrEF   - s/p cardiac cath 2/10: s/p POBA to D1, dLM 70%x 1, pLAD 70% x 1 stents. RFA sheath. IABP on LFA to increase coronary perfusion i/s/o large clot burden in LCX and Diag. ASA, Plavix loaded. Heparin started in CICU  -TTE 1/17 showed: EF of 29, severe MR, Moderately dilated left atrium. Mild left ventricular enlargement. Severe segmental left ventricular systolic dysfunction. No LV thrombus  seen. Moderate diastolic dysfunction (Stage II). Right ventricular enlargement with decreased right ventricular systolic function. severe pulmonary hypertension. Moderate-severe tricuspid  regurgitation.  - ASA 81, Plavix 75, Atorvastatin 40, metroprolol succinate 25,  - Holding home entresto for now, can consider starting low dose hydralazine if needed for BP control  - defer home Lasix for now, will resume if UO drops off or pt becomes net positive  - heparin gtt held for iabp removal this AM  - on Farxiga 5 at home, currently held       GI/NUTRITION:  - DASH/TLC diet   -protonix 40 for GI ppx   - no active issues       GENITOURINARY/RENAL:  -no active issues    HEMATOLOGIC:  -monitor H/H. Patient has chronic anemia with baseline Hgb of 9's     -DVT ppx: On systemic heparin gtt for IABP circuit, once removed will start HSQ    INFECTIOUS DISEASE:  -no active issues     ENDOCRINE:  -Monitor glucose, currently stable     Lines:   - LF IABP 2/10 dc'd

## 2023-02-11 NOTE — PROGRESS NOTE ADULT - SUBJECTIVE AND OBJECTIVE BOX
Interventional Cardiology Post Cath Progress Note  CARDIAC CATH LAB, ACP TEAM   [phone]      CHIEF COMPLAINT: Patient is a 78y old  Female who presents with a chief complaint of Severe MR (11 Feb 2023 07:31)      HPI:  77 y/o Female with PMhx of CHF, HTN, HLD, Severe MR,  RA, Giant cell arteritis, MI with no stent presents today for RHC/LHC. Patient was progressively getting more fatigued for the past several months as well as dyspnea on exertion. Had TTE which shows severe MR.  Was seen by Dr. Manriquez and referred for angiogram. Pt has been evaluated by Dr. Steinberg for MR.  Denies any implanted cardiac device.       TTE from Jan 2023: EF 29%  CONCLUSIONS:  1. Mitral annular calcification with tethered mitral valve  leaflets. Severe mitral regurgitation with an eccentric,  posteriorly directed jet.  2. Moderately dilated left atrium.  LA volume index = 42  cc/m2.  3. Mild left ventricular enlargement.  4. Severe segmental left ventricular systolic dysfunction.  Hypokinesis of the inferior wall, inferolateral wall, basal  to mid anterolateral wall, and basal inferoseptum.  Endocardial visualization enhanced with intravenous  injection of echo contrast (Definity).  No LV thrombus  seen.  5. Moderate diastolic dysfunction (Stage II).  6. Right ventricular enlargement with decreased right  ventricular systolic function.  7. Estimated right ventricular systolic pressure equals 65  mm Hg, assuming right atrial pressure equals 8 mm Hg,  consistent with severe pulmonary hypertension.  8. Normal tricuspid valve.  Moderate-severe tricuspid  regurgitation.  *** No previous Echo exam.   (10 Feb 2023 07:02)        Subjective/Observations: patient seen and examined.  Awake and alert, denies chest pain, dyspnea dizziness, palpitations, N&V, HA          Constitutional: [ ] Fever [ ] Chills [ ] Fatigue [ ] Weight change   HEENT: [ ] Blurred vision [ ] Eye Pain [ ] Headache [ ] Runny nose [ ] Sore Throat   Respiratory: [ ] Cough [ ] Wheezing [ ] Shortness of breath  Cardiovascular: [ ] Chest Pain [ ] Palpitations [ ] SANTAMARIA [ ] PND [ ] Orthopnea  Gastrointestinal: [ ] Abdominal Pain [ ] Diarrhea [ ] Constipation [ ] Hemorrhoids [ ] Nausea [ ] Vomiting  Genitourinary: [ ] Nocturia [ ] Dysuria [ ] Incontinence  Extremities: [ ] Swelling [ ] Joint Pain  Neurologic: [ ] Focal deficit [ ] Paresthesias [ ] Syncope  Lymphatic: [ ] Swelling [ ] Lymphadenopathy   Skin: [ ] Rash [ ] Ecchymoses [ ] Wounds [ ] Lesions  Psychiatry: [ ] Depression [ ] Suicidal/Homicidal Ideation [ ] Anxiety [ ] Sleep Disturbances  [ x] 10 point review of systems is otherwise negative except as mentioned above            [ ]Unable to obtain    Objective:  Vital Signs Last 24 Hrs  T(C): 37.1 (11 Feb 2023 07:01), Max: 37.1 (11 Feb 2023 05:00)  T(F): 98.8 (11 Feb 2023 07:01), Max: 98.8 (11 Feb 2023 05:00)  HR: 69 (11 Feb 2023 07:01) (62 - 78)  BP: 131/59 (11 Feb 2023 07:01) (81/52 - 131/59)  BP(mean): 85 (11 Feb 2023 07:01) (62 - 93)  RR: 19 (11 Feb 2023 07:01) (9 - 33)  SpO2: 100% (11 Feb 2023 07:01) (93% - 100%)    Parameters below as of 11 Feb 2023 03:00  Patient On (Oxygen Delivery Method): room air        02-10-23 @ 07:01  -  02-11-23 @ 07:00  --------------------------------------------------------  IN: 847 mL / OUT: 1160 mL / NET: -313 mL        PAST MEDICAL & SURGICAL HISTORY:  H/O CHF      Benign essential HTN      HLD (hyperlipidemia)      DM (diabetes mellitus)      Rheumatoid arthritis      Giant cell arteritis          SOCIAL HISTORY:  No tobacco, ethanol, or drug abuse.    FAMILY HISTORY:    No family history of acute MI or sudden cardiac death.    MEDICATIONS  (STANDING):  aspirin enteric coated 81 milliGRAM(s) Oral daily  atorvastatin 40 milliGRAM(s) Oral at bedtime  chlorhexidine 4% Liquid 1 Application(s) Topical <User Schedule>  clopidogrel Tablet 75 milliGRAM(s) Oral daily  dextrose 5%. 1000 milliLiter(s) (50 mL/Hr) IV Continuous <Continuous>  dextrose 5%. 1000 milliLiter(s) (100 mL/Hr) IV Continuous <Continuous>  dextrose 50% Injectable 25 Gram(s) IV Push once  dextrose 50% Injectable 12.5 Gram(s) IV Push once  dextrose 50% Injectable 25 Gram(s) IV Push once  glucagon  Injectable 1 milliGRAM(s) IntraMuscular once  heparin  Infusion 650 Unit(s)/Hr (7 mL/Hr) IV Continuous <Continuous>  insulin lispro (ADMELOG) corrective regimen sliding scale   SubCutaneous three times a day before meals  insulin lispro (ADMELOG) corrective regimen sliding scale   SubCutaneous at bedtime  metoprolol succinate ER 25 milliGRAM(s) Oral daily  ondansetron Injectable 4 milliGRAM(s) IV Push once  pantoprazole    Tablet 40 milliGRAM(s) Oral before breakfast    MEDICATIONS  (PRN):  dextrose Oral Gel 15 Gram(s) Oral once PRN Blood Glucose LESS THAN 70 milliGRAM(s)/deciliter      Allergies    No Known Allergies    Intolerances                                9.3    4.88  )-----------( 202      ( 10 Feb 2023 22:25 )             29.6     02-11    135  |  102  |  17  ----------------------------<  96  4.1   |  22  |  0.89    Ca    9.0      11 Feb 2023 05:24  Phos  3.2     02-11  Mg     2.0     02-11    TPro  7.0  /  Alb  3.6  /  TBili  0.6  /  DBili  x   /  AST  68<H>  /  ALT  30  /  AlkPhos  100  02-11    PT/INR - ( 10 Feb 2023 22:25 )   PT: 13.3 sec;   INR: 1.15 ratio         PTT - ( 11 Feb 2023 05:24 )  PTT:86.0 sec    CARDIAC MARKERS ( 11 Feb 2023 05:24 )  x     / x     / 353 U/L / x     / 24.7 ng/mL  CARDIAC MARKERS ( 10 Feb 2023 22:25 )  x     / x     / 367 U/L / x     / 32.7 ng/mL        Physical Exam:  No apparent distress, alert and oriented times three, appropriate affect  poor vision R < L 2/2 GCA (dx 8/2019) and b/l cataracts   JVD is not elevated, supple  Clear to auscultation with no wheezing, rhonchi or crackles  Regular rate and rhythm with no murmur, rub or gallop  Positive bowel sounds, soft, non-tender, non-distended, no masses/guarding or rebound tenderness  RIGHT groin w/o bleeding or hematoma.  soft, non tender.  Pulses in the right lower extremity are palpable +2.  IABP on LFA   Denies chest pain, denies groin/leg/foot: pain, numbness or tingling         < from: Cardiac Catheterization (02.10.23 @ 07:48) >    Conclusions:   Severe distal LM lesion.  Evidenceof thrombus in proximal LAD and  diagonal.  Successful PCI with TOÑA from LM into LAD and    balloon of D1.  DAPT for 1 year     < end of copied text >

## 2023-02-11 NOTE — PROCEDURE NOTE - ADDITIONAL PROCEDURE DETAILS
PROCEDURE NOTE  REMOVAL OF INTRA AORTIC BALLOON PUMP    The IABP (intra-aortic balloon pump) was weaned according to protocol.  Hemodynamics remained stable.  Pulses in the          right     lower extreity are palpable.  The patient was placed in the supine position.  The insertion site was identified and the sutures were removed.  The IABP was turned off and the balloon deflated.  The IABP was then removed.  Direct pressure was applied for      40         minutes.  A sandbag was applied and is  to remain in place for three hours.    Monitoring of the         right    groin and both lower extremities including neuro-vascular checks and vital signs every 15 minutes  x4, then every 30 minutes x 2, then every 1 hr x 4 was ordered.

## 2023-02-12 ENCOUNTER — TRANSCRIPTION ENCOUNTER (OUTPATIENT)
Age: 78
End: 2023-02-12

## 2023-02-12 VITALS
RESPIRATION RATE: 22 BRPM | SYSTOLIC BLOOD PRESSURE: 142 MMHG | DIASTOLIC BLOOD PRESSURE: 66 MMHG | TEMPERATURE: 98 F | OXYGEN SATURATION: 99 % | HEART RATE: 78 BPM

## 2023-02-12 LAB
ALBUMIN SERPL ELPH-MCNC: 3.7 G/DL — SIGNIFICANT CHANGE UP (ref 3.3–5)
ALP SERPL-CCNC: 94 U/L — SIGNIFICANT CHANGE UP (ref 40–120)
ALT FLD-CCNC: 26 U/L — SIGNIFICANT CHANGE UP (ref 10–45)
ANION GAP SERPL CALC-SCNC: 12 MMOL/L — SIGNIFICANT CHANGE UP (ref 5–17)
AST SERPL-CCNC: 49 U/L — HIGH (ref 10–40)
BILIRUB SERPL-MCNC: 0.6 MG/DL — SIGNIFICANT CHANGE UP (ref 0.2–1.2)
BUN SERPL-MCNC: 14 MG/DL — SIGNIFICANT CHANGE UP (ref 7–23)
CALCIUM SERPL-MCNC: 9 MG/DL — SIGNIFICANT CHANGE UP (ref 8.4–10.5)
CHLORIDE SERPL-SCNC: 100 MMOL/L — SIGNIFICANT CHANGE UP (ref 96–108)
CO2 SERPL-SCNC: 21 MMOL/L — LOW (ref 22–31)
CREAT SERPL-MCNC: 0.87 MG/DL — SIGNIFICANT CHANGE UP (ref 0.5–1.3)
EGFR: 67 ML/MIN/1.73M2 — SIGNIFICANT CHANGE UP
GLUCOSE SERPL-MCNC: 101 MG/DL — HIGH (ref 70–99)
HCT VFR BLD CALC: 29.4 % — LOW (ref 34.5–45)
HGB BLD-MCNC: 9.2 G/DL — LOW (ref 11.5–15.5)
MAGNESIUM SERPL-MCNC: 2 MG/DL — SIGNIFICANT CHANGE UP (ref 1.6–2.6)
MCHC RBC-ENTMCNC: 28.6 PG — SIGNIFICANT CHANGE UP (ref 27–34)
MCHC RBC-ENTMCNC: 31.3 GM/DL — LOW (ref 32–36)
MCV RBC AUTO: 91.3 FL — SIGNIFICANT CHANGE UP (ref 80–100)
NRBC # BLD: 0 /100 WBCS — SIGNIFICANT CHANGE UP (ref 0–0)
PHOSPHATE SERPL-MCNC: 3.1 MG/DL — SIGNIFICANT CHANGE UP (ref 2.5–4.5)
PLATELET # BLD AUTO: 184 K/UL — SIGNIFICANT CHANGE UP (ref 150–400)
POTASSIUM SERPL-MCNC: 3.9 MMOL/L — SIGNIFICANT CHANGE UP (ref 3.5–5.3)
POTASSIUM SERPL-SCNC: 3.9 MMOL/L — SIGNIFICANT CHANGE UP (ref 3.5–5.3)
PROT SERPL-MCNC: 7.2 G/DL — SIGNIFICANT CHANGE UP (ref 6–8.3)
RBC # BLD: 3.22 M/UL — LOW (ref 3.8–5.2)
RBC # FLD: 14 % — SIGNIFICANT CHANGE UP (ref 10.3–14.5)
SODIUM SERPL-SCNC: 133 MMOL/L — LOW (ref 135–145)
WBC # BLD: 5.47 K/UL — SIGNIFICANT CHANGE UP (ref 3.8–10.5)
WBC # FLD AUTO: 5.47 K/UL — SIGNIFICANT CHANGE UP (ref 3.8–10.5)

## 2023-02-12 PROCEDURE — 33967 INSERT I-AORT PERCUT DEVICE: CPT

## 2023-02-12 PROCEDURE — 71045 X-RAY EXAM CHEST 1 VIEW: CPT

## 2023-02-12 PROCEDURE — 86901 BLOOD TYPING SEROLOGIC RH(D): CPT

## 2023-02-12 PROCEDURE — 83036 HEMOGLOBIN GLYCOSYLATED A1C: CPT

## 2023-02-12 PROCEDURE — 80061 LIPID PANEL: CPT

## 2023-02-12 PROCEDURE — 84100 ASSAY OF PHOSPHORUS: CPT

## 2023-02-12 PROCEDURE — 36415 COLL VENOUS BLD VENIPUNCTURE: CPT

## 2023-02-12 PROCEDURE — C1874: CPT

## 2023-02-12 PROCEDURE — 85520 HEPARIN ASSAY: CPT

## 2023-02-12 PROCEDURE — C1887: CPT

## 2023-02-12 PROCEDURE — 92921: CPT | Mod: LD

## 2023-02-12 PROCEDURE — 84443 ASSAY THYROID STIM HORMONE: CPT

## 2023-02-12 PROCEDURE — C1753: CPT

## 2023-02-12 PROCEDURE — 86900 BLOOD TYPING SEROLOGIC ABO: CPT

## 2023-02-12 PROCEDURE — 93005 ELECTROCARDIOGRAM TRACING: CPT

## 2023-02-12 PROCEDURE — 80048 BASIC METABOLIC PNL TOTAL CA: CPT

## 2023-02-12 PROCEDURE — 82550 ASSAY OF CK (CPK): CPT

## 2023-02-12 PROCEDURE — 82962 GLUCOSE BLOOD TEST: CPT

## 2023-02-12 PROCEDURE — 83605 ASSAY OF LACTIC ACID: CPT

## 2023-02-12 PROCEDURE — C1769: CPT

## 2023-02-12 PROCEDURE — C1889: CPT

## 2023-02-12 PROCEDURE — 84484 ASSAY OF TROPONIN QUANT: CPT

## 2023-02-12 PROCEDURE — 99233 SBSQ HOSP IP/OBS HIGH 50: CPT

## 2023-02-12 PROCEDURE — 80053 COMPREHEN METABOLIC PANEL: CPT

## 2023-02-12 PROCEDURE — 86850 RBC ANTIBODY SCREEN: CPT

## 2023-02-12 PROCEDURE — C1725: CPT

## 2023-02-12 PROCEDURE — 82553 CREATINE MB FRACTION: CPT

## 2023-02-12 PROCEDURE — 85730 THROMBOPLASTIN TIME PARTIAL: CPT

## 2023-02-12 PROCEDURE — C1894: CPT

## 2023-02-12 PROCEDURE — 83735 ASSAY OF MAGNESIUM: CPT

## 2023-02-12 PROCEDURE — 85610 PROTHROMBIN TIME: CPT

## 2023-02-12 PROCEDURE — 85027 COMPLETE CBC AUTOMATED: CPT

## 2023-02-12 PROCEDURE — 93010 ELECTROCARDIOGRAM REPORT: CPT

## 2023-02-12 PROCEDURE — C9600: CPT | Mod: LM

## 2023-02-12 PROCEDURE — 93456 R HRT CORONARY ARTERY ANGIO: CPT | Mod: 59

## 2023-02-12 RX ORDER — SACUBITRIL AND VALSARTAN 24; 26 MG/1; MG/1
1 TABLET, FILM COATED ORAL
Refills: 0 | Status: DISCONTINUED | OUTPATIENT
Start: 2023-02-12 | End: 2023-02-12

## 2023-02-12 RX ORDER — METOPROLOL TARTRATE 50 MG
1 TABLET ORAL
Qty: 0 | Refills: 0 | DISCHARGE
Start: 2023-02-12

## 2023-02-12 RX ORDER — PANTOPRAZOLE SODIUM 20 MG/1
1 TABLET, DELAYED RELEASE ORAL
Qty: 0 | Refills: 0 | DISCHARGE

## 2023-02-12 RX ORDER — FUROSEMIDE 40 MG
20 TABLET ORAL DAILY
Refills: 0 | Status: DISCONTINUED | OUTPATIENT
Start: 2023-02-12 | End: 2023-02-12

## 2023-02-12 RX ORDER — SACUBITRIL AND VALSARTAN 24; 26 MG/1; MG/1
1 TABLET, FILM COATED ORAL
Qty: 0 | Refills: 0 | DISCHARGE
Start: 2023-02-12

## 2023-02-12 RX ORDER — POTASSIUM CHLORIDE 20 MEQ
10 PACKET (EA) ORAL ONCE
Refills: 0 | Status: COMPLETED | OUTPATIENT
Start: 2023-02-12 | End: 2023-02-12

## 2023-02-12 RX ORDER — METOPROLOL TARTRATE 50 MG
1 TABLET ORAL
Qty: 0 | Refills: 0 | DISCHARGE

## 2023-02-12 RX ORDER — SACUBITRIL AND VALSARTAN 24; 26 MG/1; MG/1
1 TABLET, FILM COATED ORAL
Qty: 0 | Refills: 0 | DISCHARGE

## 2023-02-12 RX ORDER — ACETAMINOPHEN 500 MG
325 TABLET ORAL EVERY 4 HOURS
Refills: 0 | Status: DISCONTINUED | OUTPATIENT
Start: 2023-02-12 | End: 2023-02-12

## 2023-02-12 RX ORDER — ATORVASTATIN CALCIUM 80 MG/1
1 TABLET, FILM COATED ORAL
Qty: 0 | Refills: 0 | DISCHARGE
Start: 2023-02-12

## 2023-02-12 RX ORDER — ATORVASTATIN CALCIUM 80 MG/1
1 TABLET, FILM COATED ORAL
Qty: 0 | Refills: 0 | DISCHARGE

## 2023-02-12 RX ORDER — PANTOPRAZOLE SODIUM 20 MG/1
1 TABLET, DELAYED RELEASE ORAL
Qty: 0 | Refills: 0 | DISCHARGE
Start: 2023-02-12

## 2023-02-12 RX ORDER — CLOPIDOGREL BISULFATE 75 MG/1
1 TABLET, FILM COATED ORAL
Qty: 30 | Refills: 3
Start: 2023-02-12 | End: 2023-06-11

## 2023-02-12 RX ADMIN — HEPARIN SODIUM 5000 UNIT(S): 5000 INJECTION INTRAVENOUS; SUBCUTANEOUS at 14:06

## 2023-02-12 RX ADMIN — Medication 20 MILLIGRAM(S): at 09:31

## 2023-02-12 RX ADMIN — Medication 25 MILLIGRAM(S): at 05:35

## 2023-02-12 RX ADMIN — Medication 325 MILLIGRAM(S): at 09:31

## 2023-02-12 RX ADMIN — SACUBITRIL AND VALSARTAN 1 TABLET(S): 24; 26 TABLET, FILM COATED ORAL at 10:36

## 2023-02-12 RX ADMIN — CLOPIDOGREL BISULFATE 75 MILLIGRAM(S): 75 TABLET, FILM COATED ORAL at 12:17

## 2023-02-12 RX ADMIN — Medication 81 MILLIGRAM(S): at 12:17

## 2023-02-12 RX ADMIN — Medication 325 MILLIGRAM(S): at 01:09

## 2023-02-12 RX ADMIN — Medication 325 MILLIGRAM(S): at 01:51

## 2023-02-12 RX ADMIN — HEPARIN SODIUM 5000 UNIT(S): 5000 INJECTION INTRAVENOUS; SUBCUTANEOUS at 05:35

## 2023-02-12 RX ADMIN — Medication 325 MILLIGRAM(S): at 10:01

## 2023-02-12 RX ADMIN — CHLORHEXIDINE GLUCONATE 1 APPLICATION(S): 213 SOLUTION TOPICAL at 05:36

## 2023-02-12 RX ADMIN — Medication 10 MILLIEQUIVALENT(S): at 05:35

## 2023-02-12 RX ADMIN — PANTOPRAZOLE SODIUM 40 MILLIGRAM(S): 20 TABLET, DELAYED RELEASE ORAL at 05:35

## 2023-02-12 RX ADMIN — Medication 10 MILLIGRAM(S): at 05:35

## 2023-02-12 NOTE — DISCHARGE NOTE PROVIDER - NSDCMRMEDTOKEN_GEN_ALL_CORE_FT
atorvastatin 40 mg oral tablet: 1 tab(s) orally once a day  Ecotrin Adult Low Strength 81 mg oral delayed release tablet: 1 tab(s) orally once a day  Entresto 49 mg-51 mg oral tablet: 1 tab(s) orally 2 times a day  Farxiga 5 mg oral tablet: 1 tab(s) orally once a day  furosemide 20 mg oral tablet: 1 tab(s) orally once a day  metoprolol succinate 25 mg oral tablet, extended release: 1 tab(s) orally once a day  pantoprazole 40 mg oral delayed release tablet: 1 tab(s) orally once a day   atorvastatin 40 mg oral tablet: 1 tab(s) orally once a day (at bedtime)  clopidogrel 75 mg oral tablet: 1 tab(s) orally once a day  Ecotrin Adult Low Strength 81 mg oral delayed release tablet: 1 tab(s) orally once a day  Farxiga 5 mg oral tablet: 1 tab(s) orally once a day  furosemide 20 mg oral tablet: 1 tab(s) orally once a day  metoprolol succinate 25 mg oral tablet, extended release: 1 tab(s) orally once a day  pantoprazole 40 mg oral delayed release tablet: 1 tab(s) orally once a day (before a meal)  sacubitril-valsartan 24 mg-26 mg oral tablet: 1 tab(s) orally 2 times a day

## 2023-02-12 NOTE — DISCHARGE NOTE PROVIDER - NSDCCPCAREPLAN_GEN_ALL_CORE_FT
PRINCIPAL DISCHARGE DIAGNOSIS  Diagnosis: CAD (coronary artery disease)  Assessment and Plan of Treatment: Low salt, low fat diet.   Weight management.   Take medications as prescribed.    No smoking.  Follow up appointments with your doctor(s)  as instructed.      SECONDARY DISCHARGE DIAGNOSES  Diagnosis: Severe mitral regurgitation  Assessment and Plan of Treatment: Continue your follow up with Dr. Steinberg and Dr. Manriquez for transcatheter edge to edge repair. Take all medications as directed.

## 2023-02-12 NOTE — DISCHARGE NOTE PROVIDER - CARE PROVIDER_API CALL
Fabrice aMnriquez (MD)  Cardiovascular Disease; Internal Medicine  [facility], [address] [place] [address]  Phone: [phone]  Fax: [phone]  Follow Up Time:     Jason Steinberg)  Interventional Cardiology  [address] [place] [address]  Phone: [phone]  Fax: [phone]  Follow Up Time:

## 2023-02-12 NOTE — PROGRESS NOTE ADULT - ASSESSMENT
77 y/o Female with PMhx of CHF, HTN, HLD, Severe MR,  RA, Giant cell arteritis, MI 2021 with no stents placed admitted to CICU s/p LHC/RHC with TOÑA and POBA to LM w/ IABP placement, now removed 2/11.    NEURO:  -AxO x3   - Patient has poor vision R < L 2/2 GCA (dx 8/2019) and b/l cataracts   - no active issues     RESPIRATORY:   - satting well on RA  - no active issues     CARDIOVASCULAR:  #HFrEF   - s/p cardiac cath 2/10: s/p POBA to D1, dLM 70%x 1, pLAD 70% x 1 stents. RFA sheath. IABP on LFA to increase coronary perfusion i/s/o large clot burden in LCX and Diag. ASA, Plavix loaded. Heparin started in CICU  -TTE 1/17 showed: EF of 29, severe MR, Moderately dilated left atrium. Mild left ventricular enlargement. Severe segmental left ventricular systolic dysfunction. No LV thrombus  seen. Moderate diastolic dysfunction (Stage II). Right ventricular enlargement with decreased right ventricular systolic function. severe pulmonary hypertension. Moderate-severe tricuspid  regurgitation.  - ASA 81, Plavix 75, Atorvastatin 40, metroprolol succinate 25,  - Holding home entresto for now, continue low dose hydralazine for afterload reduction  - defer home Lasix for now, will resume if UO drops off or pt becomes net positive  - on Farxiga 5 at home, currently held       GI/NUTRITION:  - DASH/TLC diet   - protonix 40 for GI ppx   - no active issues       GENITOURINARY/RENAL:  -no active issues    HEMATOLOGIC:  -monitor H/H. Patient has chronic anemia with baseline Hgb of 9's     -DVT ppx: Cont HSQ     INFECTIOUS DISEASE:  - afebrile, no leukocytosis  -no active issues, no need for abx     ENDOCRINE:  - A1C 5.3%, glucose controlled     Lines:   - LFA IABP 2/10-2/11   77 y/o Female with PMhx of CHF, HTN, HLD, Severe MR,  RA, Giant cell arteritis, MI 2021 with no stents placed admitted to CICU s/p LHC/RHC with TOÑA and POBA to LM w/ IABP placement, now removed 2/11.    NEURO:  -AxO x3   - Patient has poor vision R < L 2/2 GCA (dx 8/2019) and b/l cataracts   - no active issues     RESPIRATORY:   - satting well on RA  - no active issues     CARDIOVASCULAR:  #HFrEF   - s/p cardiac cath 2/10: s/p POBA to D1, dLM 70%x 1, pLAD 70% x 1 stents. RFA sheath. IABP on LFA to increase coronary perfusion i/s/o large clot burden in LCX and Diag. ASA, Plavix loaded. Heparin started in CICU  -TTE 1/17 showed: EF of 29, severe MR, Moderately dilated left atrium. Mild left ventricular enlargement. Severe segmental left ventricular systolic dysfunction. No LV thrombus  seen. Moderate diastolic dysfunction (Stage II). Right ventricular enlargement with decreased right ventricular systolic function. severe pulmonary hypertension. Moderate-severe tricuspid  regurgitation.  - ASA 81, Plavix 75, Atorvastatin 40, metroprolol succinate 25,  - on hydralazine 10mg Q8, will d/c hydral and start home dose of entresto  - resume home PO Lasix   - on Farxiga 5 at home, currently held       GI/NUTRITION:  - DASH/TLC diet   - protonix 40 for GI ppx   - no active issues   - last BM this AM      GENITOURINARY/RENAL:  -no active issues    HEMATOLOGIC:  -monitor H/H. Patient has chronic anemia with baseline Hgb of 9's     -DVT ppx: Cont HSQ     INFECTIOUS DISEASE:  - afebrile, no leukocytosis  -no active issues, no need for abx     ENDOCRINE:  - A1C 5.3%, glucose controlled     Lines:   - LFA IABP 2/10-2/11

## 2023-02-12 NOTE — DISCHARGE NOTE NURSING/CASE MANAGEMENT/SOCIAL WORK - NSDCPEFALRISK_GEN_ALL_CORE
For information on Fall & Injury Prevention, visit: [email or web address] OR  [email or web address] OR  https://www.cdc.gov/steadi/patient.html

## 2023-02-12 NOTE — DISCHARGE NOTE PROVIDER - CARE PROVIDERS DIRECT ADDRESSES
,[email or web address]_Unknown,[email or web address]

## 2023-02-12 NOTE — DISCHARGE NOTE PROVIDER - NSDCFUSCHEDAPPT_GEN_ALL_CORE_FT
Angie Alarcon  [facility]  [facility] [address]  Scheduled Appointment: 02/14/2023     Fabrice Manriquez  [facility]  CARDIOLOGY [id number]  Scheduled Appointment: 02/27/2023    Angie Alarcon  [address]  Scheduled Appointment: 03/23/2023

## 2023-02-12 NOTE — DISCHARGE NOTE PROVIDER - NSDCFUADDINST_GEN_ALL_CORE_FT
Continue your medications. Do not stop Aspirin or Plavix unless instructed by your cardiologist.  No heavy lifting, strenuous activity, bending, straining or unnecessary stair climbing  for 2 weeks. No sex for 1 week.  No driving for 2 days. You may shower 24 hours following procedure but avoid baths and swimming for 1 week. Check groin site for bleeding and/or swelling daily following procedure. Call your doctor/cardiologist immediately should it occur or if you have increased/persistent pain at the site. Follow up with your cardiologist in 1- 2 weeks. You may call [facility] at [phone] or [phone] after office hours and weekends  with any questions or concerns following your procedure. Take medications as prescribed.

## 2023-02-12 NOTE — PROGRESS NOTE ADULT - NS ATTEND AMEND GEN_ALL_CORE FT
79 yo woman with severe MR presented for elective LHC found to have significant LM ds and evidence of thrombus LAD s/p PCI and IABP placement, transferred to CICU for further management    A+Ox3  Hemodynamics acceptable, no pressors or inotropes  on IABP post PCI due to transient hypotension and instability, tolerated wean overnight and IABP was dc'd yesterday    restart entresto home dose  restart home dose of lasix   DAPT s/p PCI    cont BB   O2 sats mid to high 90s on room air  DASH diet  Normal renal function  H/H low but acceptable DVT with SQH   Afebrile, no antibiotics  Sugars controlled  IABP 2/10-2/11.

## 2023-02-12 NOTE — DISCHARGE NOTE PROVIDER - NSDCQMACEBOTHER_CARD_A_CORE_FT
Called [facility] [place] and spoke to Jonathan and rodo pt did not  the adderall and cancelled the r/f. Pt said this time she wants it  sent here in town and after this will use  [facility] in [place]. Pt declined to add her mom to the YENY.   Patient on Entresto.

## 2023-02-12 NOTE — PROGRESS NOTE ADULT - SUBJECTIVE AND OBJECTIVE BOX
CAROL LOPEZ  MRN-[id number]  Patient is a 78y old  Female who presents with a chief complaint of Severe MR (11 Feb 2023 07:31)    HPI:  77 y/o Female with PMhx of CHF, HTN, HLD, Severe MR,  RA, Giant cell arteritis, MI with no stent presents today for RHC/LHC. Patient was progressively getting more fatigued for the past several months as well as dyspnea on exertion. Had TTE which shows severe MR.  Was seen by Dr. Manriquez and referred for angiogram. Pt has been evaluated by Dr. Steinberg for MR.  Denies any implanted cardiac device.   TTE from Jan 2023: EF 29%  CONCLUSIONS:  1. Mitral annular calcification with tethered mitral valve  leaflets. Severe mitral regurgitation with an eccentric,  posteriorly directed jet.  2. Moderately dilated left atrium.  LA volume index = 42  cc/m2.  3. Mild left ventricular enlargement.  4. Severe segmental left ventricular systolic dysfunction.  Hypokinesis of the inferior wall, inferolateral wall, basal  to mid anterolateral wall, and basal inferoseptum.  Endocardial visualization enhanced with intravenous  injection of echo contrast (Definity).  No LV thrombus  seen.  5. Moderate diastolic dysfunction (Stage II).  6. Right ventricular enlargement with decreased right  ventricular systolic function.  7. Estimated right ventricular systolic pressure equals 65  mm Hg, assuming right atrial pressure equals 8 mm Hg,  consistent with severe pulmonary hypertension.  8. Normal tricuspid valve.  Moderate-severe tricuspid  regurgitation.  *** No previous Echo exam.   (10 Feb 2023 07:02)      Overnight events: Started on low dose hydralazine for afterload reduction. No acute events.     REVIEW OF SYSTEMS:    CONSTITUTIONAL: No weakness, fevers or chills  EYES/ENT: No visual changes;  No vertigo or throat pain   NECK: No pain or stiffness  RESPIRATORY: No cough, wheezing, hemoptysis; No shortness of breath  CARDIOVASCULAR: No chest pain or palpitations  GASTROINTESTINAL: No abdominal or epigastric pain. No nausea, vomiting, or hematemesis; No diarrhea or constipation. No melena or hematochezia.  GENITOURINARY: No dysuria, frequency or hematuria  NEUROLOGICAL: No numbness or weakness  SKIN: No itching, rashes      Physical Exam:  Vital Signs Last 24 Hrs  T(C): 37.2 (11 Feb 2023 23:00), Max: 37.2 (11 Feb 2023 23:00)  T(F): 98.9 (11 Feb 2023 23:00), Max: 98.9 (11 Feb 2023 23:00)  HR: 65 (12 Feb 2023 05:00) (64 - 92)  BP: 106/53 (12 Feb 2023 05:00) (92/55 - 134/63)  BP(mean): 76 (12 Feb 2023 05:00) (70 - 92)  RR: 21 (12 Feb 2023 05:00) (15 - 28)  SpO2: 99% (12 Feb 2023 05:00) (97% - 100%)    Parameters below as of 12 Feb 2023 05:00  Patient On (Oxygen Delivery Method): room air      Physical Exam:   Constitutional: NAD, well-groomed, well-developed  HEENT: EOMI, no drainage or redness  Neck: supple,  No JVD  Respiratory: Breath Sounds equal & clear bilaterally to auscultation, no rales/rhonchi/wheezing, no accessory muscle use noted  Cardiovascular: Regular rate, regular rhythm, normal S1, S2; no murmurs or rub  Gastrointestinal: Soft, non-tender, non distended, + bowel sounds  Extremities: ROJAS x 4, no peripheral edema, no cyanosis, no clubbing   Neurological: A+O x3; speech clear and intact; no sensory, motor  deficits, normal reflexes  Skin: warm, dry, well perfused  Lines: R groin no hematoma, no bruit, L groin no hematoma.    ============================I/O===========================   I&O's Detail    10 Feb 2023 07:01  -  11 Feb 2023 07:00  --------------------------------------------------------  IN:    Heparin: 34.5 mL    Heparin Infusion: 56 mL    Heparin Infusion: 6.5 mL    Oral Fluid: 750 mL  Total IN: 847 mL    OUT:    Voided (mL): 1160 mL  Total OUT: 1160 mL    Total NET: -313 mL      11 Feb 2023 07:01  -  12 Feb 2023 06:18  --------------------------------------------------------  IN:    Oral Fluid: 600 mL  Total IN: 600 mL    OUT:    Voided (mL): 1220 mL  Total OUT: 1220 mL    Total NET: -620 mL        ============================ LABS =========================                        9.2    5.47  )-----------( 184      ( 12 Feb 2023 01:56 )             29.4     02-12    133<L>  |  100  |  14  ----------------------------<  101<H>  3.9   |  21<L>  |  0.87    Ca    9.0      12 Feb 2023 01:56  Phos  3.1     02-12  Mg     2.0     02-12    TPro  7.2  /  Alb  3.7  /  TBili  0.6  /  DBili  x   /  AST  49<H>  /  ALT  26  /  AlkPhos  94  02-12    LIVER FUNCTIONS - ( 12 Feb 2023 01:56 )  Alb: 3.7 g/dL / Pro: 7.2 g/dL / ALK PHOS: 94 U/L / ALT: 26 U/L / AST: 49 U/L / GGT: x           PT/INR - ( 10 Feb 2023 22:25 )   PT: 13.3 sec;   INR: 1.15 ratio         PTT - ( 11 Feb 2023 05:24 )  PTT:86.0 sec      ======================Micro/Rad/Cardio=================  Culture: Reviewed   CXR: Reviewed  Echo:Reviewed  ======================================================  PAST MEDICAL & SURGICAL HISTORY:  H/O CHF      Benign essential HTN      HLD (hyperlipidemia)      DM (diabetes mellitus)      Rheumatoid arthritis      Giant cell arteritis   CAROL LOPEZ  MRN-[id number]  Patient is a 78y old  Female who presents with a chief complaint of Severe MR (11 Feb 2023 07:31)    HPI:  77 y/o Female with PMhx of CHF, HTN, HLD, Severe MR,  RA, Giant cell arteritis, MI with no stent presents today for RHC/LHC. Patient was progressively getting more fatigued for the past several months as well as dyspnea on exertion. Had TTE which shows severe MR.  Was seen by Dr. Manriquez and referred for angiogram. Pt has been evaluated by Dr. Steinberg for MR.  Denies any implanted cardiac device.   TTE from Jan 2023: EF 29%  CONCLUSIONS:  1. Mitral annular calcification with tethered mitral valve  leaflets. Severe mitral regurgitation with an eccentric,  posteriorly directed jet.  2. Moderately dilated left atrium.  LA volume index = 42  cc/m2.  3. Mild left ventricular enlargement.  4. Severe segmental left ventricular systolic dysfunction.  Hypokinesis of the inferior wall, inferolateral wall, basal  to mid anterolateral wall, and basal inferoseptum.  Endocardial visualization enhanced with intravenous  injection of echo contrast (Definity).  No LV thrombus  seen.  5. Moderate diastolic dysfunction (Stage II).  6. Right ventricular enlargement with decreased right  ventricular systolic function.  7. Estimated right ventricular systolic pressure equals 65  mm Hg, assuming right atrial pressure equals 8 mm Hg,  consistent with severe pulmonary hypertension.  8. Normal tricuspid valve.  Moderate-severe tricuspid  regurgitation.  *** No previous Echo exam.   (10 Feb 2023 07:02)      Overnight events: Started on low dose hydralazine for afterload reduction. No acute events.     REVIEW OF SYSTEMS:    CONSTITUTIONAL: No weakness, fevers or chills  EYES/ENT: No visual changes;  No vertigo or throat pain   NECK: No pain or stiffness  RESPIRATORY: No cough, wheezing, hemoptysis; No shortness of breath  CARDIOVASCULAR: No chest pain or palpitations  GASTROINTESTINAL: No abdominal or epigastric pain. No nausea, vomiting, or hematemesis; No diarrhea or constipation. No melena or hematochezia.  GENITOURINARY: No dysuria, frequency or hematuria  NEUROLOGICAL: No numbness or weakness  SKIN: No itching, rashes      Physical Exam:  Vital Signs Last 24 Hrs  T(C): 37.2 (11 Feb 2023 23:00), Max: 37.2 (11 Feb 2023 23:00)  T(F): 98.9 (11 Feb 2023 23:00), Max: 98.9 (11 Feb 2023 23:00)  HR: 65 (12 Feb 2023 05:00) (64 - 92)  BP: 106/53 (12 Feb 2023 05:00) (92/55 - 134/63)  BP(mean): 76 (12 Feb 2023 05:00) (70 - 92)  RR: 21 (12 Feb 2023 05:00) (15 - 28)  SpO2: 99% (12 Feb 2023 05:00) (97% - 100%)    Parameters below as of 12 Feb 2023 05:00  Patient On (Oxygen Delivery Method): room air      Physical Exam:   Constitutional: NAD, well-groomed, well-developed  HEENT: EOMI, no drainage or redness  Neck: supple,  No JVD  Respiratory: Breath Sounds equal & clear bilaterally to auscultation, no rales/rhonchi/wheezing, no accessory muscle use noted  Cardiovascular: Regular rate, regular rhythm, normal S1, S2  Gastrointestinal: Soft, non-tender, non distended, + bowel sounds  Extremities: ROJAS x 4, no peripheral edema, no cyanosis, no clubbing   Neurological: A+O x3; speech clear and intact; no sensory, motor  deficits, normal reflexes  Skin: warm, dry, well perfused  Lines: R groin no hematoma, no bruit, L groin no hematoma no bruit.    ============================I/O===========================   I&O's Detail    10 Feb 2023 07:01  -  11 Feb 2023 07:00  --------------------------------------------------------  IN:    Heparin: 34.5 mL    Heparin Infusion: 56 mL    Heparin Infusion: 6.5 mL    Oral Fluid: 750 mL  Total IN: 847 mL    OUT:    Voided (mL): 1160 mL  Total OUT: 1160 mL    Total NET: -313 mL      11 Feb 2023 07:01  -  12 Feb 2023 06:18  --------------------------------------------------------  IN:    Oral Fluid: 600 mL  Total IN: 600 mL    OUT:    Voided (mL): 1220 mL  Total OUT: 1220 mL    Total NET: -620 mL        ============================ LABS =========================                        9.2    5.47  )-----------( 184      ( 12 Feb 2023 01:56 )             29.4     02-12    133<L>  |  100  |  14  ----------------------------<  101<H>  3.9   |  21<L>  |  0.87    Ca    9.0      12 Feb 2023 01:56  Phos  3.1     02-12  Mg     2.0     02-12    TPro  7.2  /  Alb  3.7  /  TBili  0.6  /  DBili  x   /  AST  49<H>  /  ALT  26  /  AlkPhos  94  02-12    LIVER FUNCTIONS - ( 12 Feb 2023 01:56 )  Alb: 3.7 g/dL / Pro: 7.2 g/dL / ALK PHOS: 94 U/L / ALT: 26 U/L / AST: 49 U/L / GGT: x           PT/INR - ( 10 Feb 2023 22:25 )   PT: 13.3 sec;   INR: 1.15 ratio         PTT - ( 11 Feb 2023 05:24 )  PTT:86.0 sec      ======================Micro/Rad/Cardio=================  Culture: Reviewed   CXR: Reviewed  Echo:Reviewed  ======================================================  PAST MEDICAL & SURGICAL HISTORY:  H/O CHF      Benign essential HTN      HLD (hyperlipidemia)      DM (diabetes mellitus)      Rheumatoid arthritis      Giant cell arteritis

## 2023-02-12 NOTE — DISCHARGE NOTE PROVIDER - NSDCCPTREATMENT_GEN_ALL_CORE_FT
PRINCIPAL PROCEDURE  Procedure: Left heart cardiac cath  Findings and Treatment: 2/10/23 LHC: TOÑA x1 to LM 70% with POBA. IABP Placed.

## 2023-02-12 NOTE — DISCHARGE NOTE PROVIDER - HOSPITAL COURSE
79 y/o Female with PMhx of CHF, HTN, HLD, Severe MR,  RA, Giant cell arteritis, MI with no stent presents for elective RHC/LHC for MV transcatheter edge to edge repair. She had a MI in 2021 for which she had an angiogram at [facility] which demonstrated significant CAD (no report or imaging available for review) but was not amenable to PCI, during which time LVEF was reportedly 40-45%. Since that time she has had several  admissions for CHF at [facility] and [facility] and recently established care with Dr. Manriquez (cardiology) this past summer. Over the past several months she has experienced progressive fatigue and dyspnea with exertion w/o angina as well as orthopnea requiring sleeping propped up with 2 pillows. She also has some leg swelling which she controls with diuretics. After establishing care with , an echo was done on 1/17/23 which showed an EF of 29% and severe MR, for which she was referred to Dr. Steinberg for evaluation of MR. Dr. Steinberg recommended MV RADHA (Mitral Valve Transcatheter Edge to Edge Repair). To that end, the patient was admitted to [facility] for R/L Heart catheterization with possible PCI 2/10. Patient admitted to CICU post cardiac cath which was revealing of LM 70% s/p TOÑA x1 and POBA with IABP placement. Patient tolerated weaning of IABP and was ultimately removed 2/11. Patient tolerated her home GDMT regimen and remained hemodynamically stable. Her markers of end organ perfusion and BP have remained stable. She tolerates ambulation with walker.

## 2023-02-12 NOTE — DISCHARGE NOTE NURSING/CASE MANAGEMENT/SOCIAL WORK - PATIENT PORTAL LINK FT
You can access the FollowMyHealth Patient Portal offered by [facility] by registering at the following website: [email or web address]. By joining [employer]’s FollowMyHealth portal, you will also be able to view your health information using other applications (apps) compatible with our system.

## 2023-02-13 LAB — GLUCOSE BLDC GLUCOMTR-MCNC: 142 MG/DL — HIGH (ref 70–99)

## 2023-02-24 RX ORDER — SACUBITRIL AND VALSARTAN 49; 51 MG/1; MG/1
49-51 TABLET, FILM COATED ORAL
Qty: 60 | Refills: 3 | Status: DISCONTINUED | COMMUNITY
Start: 1900-01-01 | End: 2023-02-24

## 2023-02-27 ENCOUNTER — APPOINTMENT (OUTPATIENT)
Dept: CARDIOLOGY | Facility: CLINIC | Age: 78
End: 2023-02-27
Payer: MEDICARE

## 2023-02-27 ENCOUNTER — APPOINTMENT (OUTPATIENT)
Dept: HEART FAILURE | Facility: CLINIC | Age: 78
End: 2023-02-27
Payer: MEDICARE

## 2023-02-27 ENCOUNTER — NON-APPOINTMENT (OUTPATIENT)
Age: 78
End: 2023-02-27

## 2023-02-27 VITALS
HEART RATE: 71 BPM | RESPIRATION RATE: 16 BRPM | OXYGEN SATURATION: 100 % | HEIGHT: 57 IN | DIASTOLIC BLOOD PRESSURE: 63 MMHG | SYSTOLIC BLOOD PRESSURE: 133 MMHG | TEMPERATURE: 98.1 F | WEIGHT: 118 LBS | BODY MASS INDEX: 25.46 KG/M2

## 2023-02-27 PROCEDURE — 99214 OFFICE O/P EST MOD 30 MIN: CPT | Mod: 25

## 2023-02-27 PROCEDURE — 99214 OFFICE O/P EST MOD 30 MIN: CPT

## 2023-02-27 PROCEDURE — 93000 ELECTROCARDIOGRAM COMPLETE: CPT

## 2023-02-27 RX ORDER — LEVOCETIRIZINE DIHYDROCHLORIDE 5 MG/1
5 TABLET ORAL DAILY
Refills: 0 | Status: DISCONTINUED | COMMUNITY
End: 2023-02-27

## 2023-02-27 RX ORDER — ARTIFICIAL TEARS 1; 2; 3 MG/ML; MG/ML; MG/ML
SOLUTION/ DROPS OPHTHALMIC
Refills: 0 | Status: DISCONTINUED | COMMUNITY
End: 2023-02-27

## 2023-02-27 RX ORDER — FAMOTIDINE 20 MG/1
20 TABLET, FILM COATED ORAL DAILY
Refills: 0 | Status: DISCONTINUED | COMMUNITY
End: 2023-02-27

## 2023-02-27 NOTE — PHYSICAL EXAM
[Well Nourished] : well nourished [No Acute Distress] : no acute distress [Normal S1, S2] : normal S1, S2 [Clear Lung Fields] : clear lung fields [Soft] : abdomen soft [Non Tender] : non-tender [No Rash] : no rash [Normal] : alert and oriented, normal memory [No Edema] : no edema [de-identified] : decreased vision [de-identified] : no carotid bruits appreciated [de-identified] : JVP 7 cm  [de-identified] : RRR  3/6 SM at apex  [de-identified] : decreased at right base [de-identified] : liver tip not palpable [de-identified] : slow gait

## 2023-02-27 NOTE — PHYSICAL EXAM
[Well Nourished] : well nourished [No Acute Distress] : no acute distress [Frail] : frail [Normal Conjunctiva] : normal conjunctiva [Normal Venous Pressure] : normal venous pressure [No Carotid Bruit] : no carotid bruit [Normal S1, S2] : normal S1, S2 [No Rub] : no rub [No Gallop] : no gallop [Clear Lung Fields] : clear lung fields [Good Air Entry] : good air entry [No Respiratory Distress] : no respiratory distress  [Soft] : abdomen soft [Non Tender] : non-tender [No Masses/organomegaly] : no masses/organomegaly [Normal Bowel Sounds] : normal bowel sounds [Normal Gait] : normal gait [No Edema] : no edema [No Cyanosis] : no cyanosis [No Clubbing] : no clubbing [No Varicosities] : no varicosities [No Rash] : no rash [No Skin Lesions] : no skin lesions [Moves all extremities] : moves all extremities [No Focal Deficits] : no focal deficits [Normal Speech] : normal speech [Alert and Oriented] : alert and oriented [Normal memory] : normal memory [de-identified] : soft systolic murmur at LUSB

## 2023-02-27 NOTE — CARDIOLOGY SUMMARY
[de-identified] : 2/27/23 NSR 66   with NSST with PVC\par 2/2/23 NSR 84   with NSST with PVC's [de-identified] : TTE 1/17/23 LVEF 29%, severe MR, mod severe TR, severe pHTN  [de-identified] : 2/10/23  S/P PCI LAD with TOÑA with RHC- RA 3, PW 11. CI 3.7, SVR 1000, PVR VARGAS 1.8. \par \par MI in 2021 for which she had an angiogram at [facility] that demonstrated significant CAD (I do not have a report or imaging for review) but was felt not amenable to PC

## 2023-02-27 NOTE — REVIEW OF SYSTEMS
[Feeling Fatigued] : feeling fatigued [Dyspnea on exertion] : dyspnea during exertion [Negative] : Heme/Lymph [Fever] : no fever [Chills] : no chills [SOB] : no shortness of breath [Chest Discomfort] : no chest discomfort [Lower Ext Edema] : no extremity edema [Leg Claudication] : no intermittent leg claudication [Palpitations] : no palpitations [Orthopnea] : no orthopnea [PND] : no PND [Syncope] : no syncope [Wheezing] : no wheezing [Coughing Up Blood] : no hemoptysis

## 2023-02-27 NOTE — ASSESSMENT
[FreeTextEntry1] : 78 year old female with past medical history of HTN, HLD, ischemic cardiomyopathy/chronic  systolic HFrEF ( LVIDD 29% 1/2023), rheumatoid arthritis, and giant cell arteritis ( loss of vision 2019 left eye),  MI in 2021 for which she had an angiogram at [facility] that demonstrated significant CAD ( as per family record)  but was not revascularized , felt not amenable to PCI . Her LVEF was reportedly 40-45% at that time with TTE 1/17/23 LVEF 29%, severe MR, mod severe TR, severe pHTN. S/P PCI LAD with TOÑA 2/10/23 with RHC- RA 3, PW 11. CI 3.7, SVR 1000, PVR VARGAS 1.8\par ACC/AHA Stage C, NYHA Class III symptoms\par Appears euvolemic and with B/P 133/63
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
## 2023-02-27 NOTE — HISTORY OF PRESENT ILLNESS
[FreeTextEntry1] : Ms Teresa Jones is an 78 year old female with past medical history of HTN, HLD, ischemic cardiomyopathy/chronic  systolic HFrEF ( LVIDD 29% 1/2023), rheumatoid arthritis, and giant cell arteritis ( loss of vision 2019 left eye),  MI in 2021 for which she had an angiogram at [facility] that demonstrated significant CAD ( as per family record)  but was not revascularized , felt not amenable to PCI . Her LVEF was reportedly 40-45% at that time with TTE 1/17/23 LVEF 29%, severe MR, mod severe TR, severe pHTN  S/P PCI LAD with TOÑA 2/10/23 with RHC- RA 3, PW 11. CI 3.7, SVR 1000, PVR VARGAS 1.8. Pt is her for a post hospital follow up after admission 2/10-2/12/23 at [facility] for LHC/RHC. Pt seen by Dr. Manriquez today.  \par \par Pt is accompanied by her daughter for appointment. Since last appt 2/3/23,  S/P PCI LAD with TOÑA 2/10/23 with RHC- RA 3, PW 11. CI 3.7, SVR 1000, PVR VARGAS 1.8. \par Pt bought her meds in and is taking HF meds including; Entresto 24-26 mg bid, metoprolol 25 mg daily, farxiga 5 mg daily and takes furosemide 20 mg daily, pantoprazole, ASA 81 mg daily and plavix 75 mg daily. \par \par Home weight decreased to 115 lbs from prior 122-124 lbs. Labs on 2/12/23 with Na 133, K 3.9, BUN/creat 14/0.87. She states she is able to walk in her house room to room without dyspnea.  She is also limited by pain in her knees. She can climb 1 flight os stairs slowly. She sleeps with 2 pillows on her side ( on couch)  and states she has snored for many years but denies orthopnea/PND. She gets up a night to go to the bathroom.  Adhering to low salt diet and is drinking less than 2 liters of fluid per day. \par \par She denies chest pain, palpitations, dizziness/LH and syncope. No falls. Pt does not have an ICD. \par \par \par

## 2023-02-27 NOTE — DISCUSSION/SUMMARY
[___ Week(s)] : in [unfilled] week(s) [FreeTextEntry1] : 1. Ischemic cardiomyopathy/chronic  systolic HFrEF ( LVIDD 29% 1/2023),\par -S/P PCI LAD with TOÑA 2/10/23 with RHC- RA 3, PW 11. CI 3.7, SVR 1000, PVR VARGAS 1.8\par - TTE with segmental LV systolic dysfunction with Severe MR, TR and severe pHTN\par - continue furosemide to 20 mg daily with additional as needed for weight gain of 2-3 lbs in 2-3 days, weight today 115 lbs \par - increase Entresto to 49-51 mg bid, will recheck labs with next visit and if K and renal function are stable, will increase to  mg bid\par - continue Farxiga 5 mg daily. (States she took Jardiance in the past and didn't feel well so will stop if she is having any adverse symptoms).\par - limit salt to less than 2000 mg per day\par - limit fluid to less than 2 liters per day\par - follow up with Dr. Manriquez\par - will continue optimization of GDMT in preparation for intervention on Mitral valve-  MV RADHA\par \par 2. CAD/MI ( 2021) - no active chest pain or acute EKG changes\par - scheduled for RHC/LHC on 2/10/23 and is S/P PCI LAD with TOÑA 2/10/23 with RHC- RA 3, PW 11. CI 3.7, SVR 1000, PVR VARGAS 1.8\par - continue ASA 81 mg daily\par - continue plavix 75 mg daily\par - continue atorvastatin 40 mg qhs\par \par 3. HTN\par - increase Entresto as above\par - metoprolol as above\par \par 4. Giant cell arteritis/ RA\par - follow up with rheumatology\par \par Follow up in the office in 3 weeks for further medication optimization in preparation for  MV RADHA wit structural heart team

## 2023-03-06 PROBLEM — I10 ESSENTIAL (PRIMARY) HYPERTENSION: Chronic | Status: ACTIVE | Noted: 2023-02-10

## 2023-03-06 PROBLEM — E11.9 TYPE 2 DIABETES MELLITUS WITHOUT COMPLICATIONS: Chronic | Status: ACTIVE | Noted: 2023-02-10

## 2023-03-06 PROBLEM — M06.9 RHEUMATOID ARTHRITIS, UNSPECIFIED: Chronic | Status: ACTIVE | Noted: 2023-02-10

## 2023-03-06 PROBLEM — E78.5 HYPERLIPIDEMIA, UNSPECIFIED: Chronic | Status: ACTIVE | Noted: 2023-02-10

## 2023-03-06 PROBLEM — Z86.79 PERSONAL HISTORY OF OTHER DISEASES OF THE CIRCULATORY SYSTEM: Chronic | Status: ACTIVE | Noted: 2023-02-10

## 2023-03-06 PROBLEM — M31.6 OTHER GIANT CELL ARTERITIS: Chronic | Status: ACTIVE | Noted: 2023-02-10

## 2023-03-09 ENCOUNTER — OUTPATIENT (OUTPATIENT)
Dept: OUTPATIENT SERVICES | Facility: HOSPITAL | Age: 78
LOS: 1 days | End: 2023-03-09
Payer: COMMERCIAL

## 2023-03-09 ENCOUNTER — APPOINTMENT (OUTPATIENT)
Dept: CARDIOTHORACIC SURGERY | Facility: CLINIC | Age: 78
End: 2023-03-09
Payer: MEDICARE

## 2023-03-09 VITALS
RESPIRATION RATE: 18 BRPM | HEART RATE: 71 BPM | BODY MASS INDEX: 24.35 KG/M2 | WEIGHT: 116 LBS | HEIGHT: 58 IN | OXYGEN SATURATION: 100 % | DIASTOLIC BLOOD PRESSURE: 69 MMHG | SYSTOLIC BLOOD PRESSURE: 123 MMHG

## 2023-03-09 DIAGNOSIS — I50.20 UNSPECIFIED SYSTOLIC (CONGESTIVE) HEART FAILURE: ICD-10-CM

## 2023-03-09 DIAGNOSIS — I38 ENDOCARDITIS, VALVE UNSPECIFIED: ICD-10-CM

## 2023-03-09 DIAGNOSIS — I35.0 NONRHEUMATIC AORTIC (VALVE) STENOSIS: ICD-10-CM

## 2023-03-09 PROCEDURE — 93306 TTE W/DOPPLER COMPLETE: CPT

## 2023-03-09 PROCEDURE — 93306 TTE W/DOPPLER COMPLETE: CPT | Mod: 26

## 2023-03-09 PROCEDURE — 76377 3D RENDER W/INTRP POSTPROCES: CPT | Mod: 26

## 2023-03-09 PROCEDURE — 99213 OFFICE O/P EST LOW 20 MIN: CPT

## 2023-03-09 PROCEDURE — 76377 3D RENDER W/INTRP POSTPROCES: CPT

## 2023-03-09 NOTE — PHYSICAL EXAM
[Well Developed] : well developed [Well Nourished] : well nourished [Normal Rate] : normal [Rhythm Regular] : regular [No Murmur] : no murmurs heard [Clear Lung Fields] : clear lung fields [Soft] : abdomen soft [Non Tender] : non-tender [Normal Gait] : normal gait [No Edema] : no edema [Normal] : alert and oriented, normal memory

## 2023-03-09 NOTE — DISCUSSION/SUMMARY
[Patient] : the patient [___ Month(s)] : in [unfilled] month(s) [FreeTextEntry4] : Mitral and Tricuspid Regurgitation  [FreeTextEntry2] : Daughter

## 2023-03-09 NOTE — CARDIOLOGY SUMMARY
[de-identified] : 1/17 showed: EF of 29, severe MR, Moderately dilated left atrium. Mild left ventricular enlargement. Severe segmental left ventricular systolic dysfunction. No LV thrombus seen. Moderate diastolic dysfunction (Stage II). Right ventricular enlargement with decreased right ventricular systolic function. Severe pulmonary hypertension. Moderate-severe tricuspid regurgitation.\par  [de-identified] : 2/10/23: s/p POBA to D1, dLM 70%x 1, pLAD 70% x 1 stents. RFA sheath. IABP on LFA to increase coronary perfusion i/s/o large clot burden in LCX and Diag. ASA, Plavix loaded. Heparin started in CICU\par

## 2023-03-09 NOTE — HISTORY OF PRESENT ILLNESS
[FreeTextEntry1] : Ms. Jones returns to clinic for follow up evaluation of mitral and tricuspid regurgitation. She was initially evaluated in January and recommended to optimize GDMT and undergo coronary evaluation with consideration for PCI if deemed appropriate. She underwent cardiac catheterization on 2/10 and was found to have significant CAD that was revascularized (TOÑA to LM, POBA to D1, and TOÑA to pLAD). \par \par Since her last visit and coronary revascularization she reports improvement in her energy level, she is more comfortable climbing stairs with improvement in dyspnea. She has no angina, she sleeps with her head elevated for many years on two pillows she has no PND, or LE edema. Her appetite is good. \par \par Repeat echocardiogram today reviewed with Dr. Steinberg and Dr. Booker, her mitral regurgitation is moderate and tricuspid regurgitation is mild to moderate at most.

## 2023-03-09 NOTE — ASSESSMENT
[FreeTextEntry1] : Ms. Jones presents today for follow up evaluation of mitral and tricuspid regurgitation. Since her last visit she underwent coronary revascularization and optimization of GDMT. She is feeling well with symptomatic improvement, offering no complaints today. She is euvolemic on exam. Repeat echocardiogram reviewed with Dr. Booker and Dr. Steinberg, her MR is moderate and TR mild-moderate, warranting no structural intervention at this time. Discussed with both patient and daughter. She will return to clinic in six months with repeat TTE for reassessment and will notify us sooner if symptoms progress.

## 2023-03-09 NOTE — REASON FOR VISIT
[Structural Heart and Valve Disease] : structural heart and valve disease [Other: ____] : [unfilled] [FreeTextEntry3] : Dr. Manriquez

## 2023-03-09 NOTE — REVIEW OF SYSTEMS
[Orthopnea] : orthopnea [Joint Pain] : joint pain [Negative] : Heme/Lymph [Fever] : no fever [Chills] : no chills [Feeling Fatigued] : not feeling fatigued [Dyspnea on exertion] : not dyspnea during exertion [Chest Discomfort] : no chest discomfort [Lower Ext Edema] : no extremity edema [Palpitations] : no palpitations [PND] : no PND [Abdominal Pain] : no abdominal pain [Change in Appetite] : no change in appetite [Dizziness] : no dizziness

## 2023-03-23 ENCOUNTER — APPOINTMENT (OUTPATIENT)
Dept: RHEUMATOLOGY | Facility: CLINIC | Age: 78
End: 2023-03-23
Payer: MEDICARE

## 2023-03-23 VITALS
HEART RATE: 79 BPM | DIASTOLIC BLOOD PRESSURE: 79 MMHG | BODY MASS INDEX: 24.35 KG/M2 | TEMPERATURE: 98 F | HEIGHT: 58 IN | OXYGEN SATURATION: 98 % | WEIGHT: 116 LBS | SYSTOLIC BLOOD PRESSURE: 146 MMHG

## 2023-03-23 DIAGNOSIS — H53.9 UNSPECIFIED VISUAL DISTURBANCE: ICD-10-CM

## 2023-03-23 PROCEDURE — 99214 OFFICE O/P EST MOD 30 MIN: CPT

## 2023-03-27 ENCOUNTER — APPOINTMENT (OUTPATIENT)
Dept: HEART FAILURE | Facility: CLINIC | Age: 78
End: 2023-03-27
Payer: MEDICARE

## 2023-03-27 VITALS
DIASTOLIC BLOOD PRESSURE: 84 MMHG | TEMPERATURE: 97.7 F | HEIGHT: 58 IN | SYSTOLIC BLOOD PRESSURE: 153 MMHG | OXYGEN SATURATION: 100 % | WEIGHT: 120.38 LBS | BODY MASS INDEX: 25.27 KG/M2 | HEART RATE: 72 BPM

## 2023-03-27 DIAGNOSIS — D64.9 ANEMIA, UNSPECIFIED: ICD-10-CM

## 2023-03-27 PROCEDURE — 99214 OFFICE O/P EST MOD 30 MIN: CPT | Mod: 25

## 2023-03-27 PROCEDURE — 93000 ELECTROCARDIOGRAM COMPLETE: CPT

## 2023-03-27 NOTE — ASSESSMENT
[FreeTextEntry1] : 78 year old female with past medical history of HTN, HLD, ischemic cardiomyopathy/chronic  systolic HFrEF ( LVIDD 29% 1/2023), rheumatoid arthritis, and giant cell arteritis ( loss of vision 2019 left eye),  MI in 2021 for which she had an angiogram at [facility] that demonstrated significant CAD ( as per family record)  but was not revascularized , felt not amenable to PCI . Her LVEF was reportedly 40-45% at that time with TTE 1/17/23 LVEF 29%, severe MR, mod severe TR, severe pHTN. S/P PCI LAD and revascularized with TOÑA 2/10/23 with RHC- RA 3, PW 11. CI 3.7, SVR 1000, PVR VARGAS 1.8\par ACC/AHA Stage C, NYHA Class III symptoms\par Appears compensated but hypertensive

## 2023-03-27 NOTE — CARDIOLOGY SUMMARY
[de-identified] : 3/27/23 NSR 66   with NSST\par  2/27/23 NSR 66   with NSST with PVC\par 2/2/23 NSR 84   with NSST with PVC's [de-identified] : 3/9/23 echocardiogram  [facility];  moderate mitral regurgitation and tricuspid regurgitation mild to moderate at most. \par TTE 1/17/23 LVEF 29%, severe MR, mod severe TR, severe pHTN  [de-identified] : 2/10/23  S/P PCI LAD with TOÑA with RHC- RA 3, PW 11. CI 3.7, SVR 1000, PVR VARGAS 1.8. \par \par MI in 2021 for which she had an angiogram at [facility] that demonstrated significant CAD (I do not have a report or imaging for review) but was felt not amenable to PC

## 2023-03-27 NOTE — HISTORY OF PRESENT ILLNESS
[Currently Experiencing] : currently [Arthralgias] : arthralgias [___ Month(s) Ago] : [unfilled] month(s) ago [FreeTextEntry1] : s/p 2 stents in place with improvement on cardiac function\par ongoing hand and knee pain - has multiple deformities over the hands from RA - although patient is seronegative\par defer medications at this time\par dexa with osteoporosis 2.5 over the spine\par \par

## 2023-03-27 NOTE — PHYSICAL EXAM
[Well Nourished] : well nourished [No Acute Distress] : no acute distress [Normal S1, S2] : normal S1, S2 [Clear Lung Fields] : clear lung fields [Soft] : abdomen soft [Non Tender] : non-tender [No Edema] : no edema [No Rash] : no rash [Normal] : alert and oriented, normal memory [de-identified] : decreased vision [de-identified] : no carotid bruits appreciated [de-identified] : JVP 7 cm  [de-identified] : RRR  3/6 SM at apex  [de-identified] : decreased at right base [de-identified] : liver tip not palpable [de-identified] : slow gait, multiple hand deformities

## 2023-03-27 NOTE — DISCUSSION/SUMMARY
[___ Week(s)] : in [unfilled] week(s) [FreeTextEntry1] : 1. Ischemic cardiomyopathy/chronic  systolic HFrEF ( LVIDD 29% 1/2023),\par -S/P PCI LAD with TOÑA 2/10/23 with RHC- RA 3, PW 11. CI 3.7, SVR 1000, PVR VARGAS 1.8\par - TRepeat TTE 3/9 with improvement with mod MR and mild mod TR from prior TTE with segmental LV systolic dysfunction with Severe MR, TR and severe pHTN. No structural intervention warranted at this time, for repeat TTE 6 months.\par - continue furosemide to 20 mg daily with additional as needed for weight gain of 2-3 lbs in 2-3 days, weight today 118 lbs from 115 lbs \par - increase Entresto to  mg bid, labs 2/2/23 with K 4.7 and BUN/creat 19/0.91\par - continue Farxiga 5 mg daily. (States she took Jardiance in the past and didn't feel well so will stop if she is having any adverse symptoms).\par - limit salt to less than 2000 mg per day\par - limit fluid to less than 2 liters per day\par - follow up with Dr. Manriquez\par - will continue optimization of GDMT . No intervention on Mitral valve at this time\par \par 2. CAD/MI ( 2021) - no active chest pain or acute EKG changes\par - RHC/LHC on 2/10/23 and is S/P PCI LAD with TOÑA 2/10/23 with RHC- RA 3, PW 11. CI 3.7, SVR 1000, PVR VARGAS 1.8\par - continue ASA 81 mg daily\par - continue plavix 75 mg daily\par - continue atorvastatin 40 mg qhs\par \par 3. HTN\par - increase Entresto as above\par - metoprolol as above\par \par 4. Giant cell arteritis/ RA\par - follow up with rheumatology\par - started on methotrexate 5 mg daily\par \par Follow up in the office in 4 weeks for further medication optimization.   MV RADHA with structural heart team not warranted at this time, will follow up in 6 months for TTE [EKG obtained to assist in diagnosis and management of assessed problem(s)] : EKG obtained to assist in diagnosis and management of assessed problem(s)

## 2023-03-27 NOTE — ASSESSMENT
[FreeTextEntry1] : \par \par 1. RA for many years with multiple hand deformities - ongoing anemia of chronic disease - seronegtive with severe and aggressive RA with multiple hand deformities - ongoing pain - will start methotrexate 5 mg and increase as tolerated - to help with ongoing symptoms\par 2. GCA - had been on prednisone for 3 years - elevated esr but no active symptoms of GCA - needs to see optho for ongoing blurred vision, no headaches or eye pain - referraql given\par 3. CAD with CHF\par 4. osteoporosis - over the spine -2.5\par 5. moderate bilateral knee OA - defer injection or referral to PT at this time\par \par patient education provided regarding risk/benefits, possible side effects and medications interactions\par \par \par \par I reviewed previous labs results with patients.\par Diagnosis and Prognosis discussed\par Continue with current medications\par medications refilled\par education provided on methotrexate\par F/u 2 months\par \par \par  General Sunscreen Counseling: I recommended a broad spectrum sunscreen with a SPF of 30 or higher.  I explained that SPF 30 sunscreens block approximately 97 percent of the sun's harmful rays.  Sunscreens should be applied at least 15 minutes prior to expected sun exposure and then every 2 hours after that as long as sun exposure continues. If swimming or exercising sunscreen should be reapplied every 45 minutes to an hour after getting wet or sweating.  One ounce, or the equivalent of a shot glass full of sunscreen, is adequate to protect the skin not covered by a bathing suit. I also recommended a lip balm with a sunscreen as well. Sun protective clothing can be used in lieu of sunscreen but must be worn the entire time you are exposed to the sun's rays. Detail Level: Zone Products Recommended: recommended Neutragena Baby Faces stick with titanium dioxide for the face and Vanicream products of the body

## 2023-03-27 NOTE — PHYSICAL EXAM

## 2023-03-27 NOTE — HISTORY OF PRESENT ILLNESS
[FreeTextEntry1] : Ms Teresa Jones is an 78 year old female with past medical history of HTN, HLD, ischemic cardiomyopathy/chronic  systolic HFrEF ( LVIDD 29% 1/2023), rheumatoid arthritis, and giant cell arteritis ( loss of vision 2019 left eye),  MI in 2021 for which she had an angiogram at [facility] that demonstrated significant CAD ( as per family record)  but was not revascularized , felt not amenable to PCI . Her LVEF was reportedly 40-45% at that time with TTE 1/17/23 LVEF 29%, severe MR, mod severe TR, severe pHTN  S/P PCI LAD and revascularized with TOÑA 2/10/23 with RHC- RA 3, PW 11. CI 3.7, SVR 1000, PVR VARGAS 1.8.Last admission 2/10-2/12/23 at [facility] for LHC/RHC.  3/9/23 TTE; mod MR and mild to mod TR. Pt is her for a follow up today.  \par \par Pt is accompanied by her daughter for appointment. Since last appt 2/27/23, she has been feeling well. She is tolerating the up titration of her HF meds but was taking  Entresto 49-51 mg daily, not bid but is taking, metoprolol 25 mg daily, farxiga 5 mg daily and takes furosemide 20 mg daily, pantoprazole, ASA 81 mg daily and plavix 75 mg daily. \par \par Home weight increased to 118 lbs from 115 lbs and B/P today in office is 153/84. She followed up with structural heart on 3/9/23 and repeat echocardiogram 3/9/23 reviewed with moderate mitral regurgitation and tricuspid regurgitation mild to moderate and does not warrant structural intervention at this time. For repeat TTE in 6 months.\par She followed up with rheumatology for RA/history of giant cell arteritis 2019 and started methotrexate 5 mg daily  for arthralgias with hand deformities. \par   \par Currently, states she is able to walk in her house room to room without dyspnea.  She is also limited by pain in her knees. She can climb 1 flight os stairs slowly. She sleeps with 2 pillows on her side ( on couch)  and states she has snored for many years but denies orthopnea/PND. She gets up a night to go to the bathroom.  Adhering to low salt diet and is drinking less than 2 liters of fluid per day. \par \par She denies chest pain, palpitations, dizziness/LH and syncope. No falls. Pt does not have an ICD. \par \par \par

## 2023-04-12 ENCOUNTER — NON-APPOINTMENT (OUTPATIENT)
Age: 78
End: 2023-04-12

## 2023-04-12 LAB
ALBUMIN SERPL ELPH-MCNC: 4.5 G/DL
ALP BLD-CCNC: 114 U/L
ALT SERPL-CCNC: 22 U/L
ANION GAP SERPL CALC-SCNC: 14 MMOL/L
AST SERPL-CCNC: 28 U/L
BILIRUB SERPL-MCNC: 0.4 MG/DL
BUN SERPL-MCNC: 23 MG/DL
CALCIUM SERPL-MCNC: 9.7 MG/DL
CHLORIDE SERPL-SCNC: 98 MMOL/L
CO2 SERPL-SCNC: 21 MMOL/L
CREAT SERPL-MCNC: 1.03 MG/DL
EGFR: 55 ML/MIN/1.73M2
NT-PROBNP SERPL-MCNC: 1305 PG/ML
POTASSIUM SERPL-SCNC: 4.8 MMOL/L
PROT SERPL-MCNC: 7.6 G/DL
SODIUM SERPL-SCNC: 134 MMOL/L

## 2023-04-19 ENCOUNTER — NON-APPOINTMENT (OUTPATIENT)
Age: 78
End: 2023-04-19

## 2023-04-19 ENCOUNTER — APPOINTMENT (OUTPATIENT)
Dept: OPHTHALMOLOGY | Facility: CLINIC | Age: 78
End: 2023-04-19
Payer: MEDICARE

## 2023-04-19 PROCEDURE — 92133 CPTRZD OPH DX IMG PST SGM ON: CPT

## 2023-04-19 PROCEDURE — 99204 OFFICE O/P NEW MOD 45 MIN: CPT

## 2023-04-19 PROCEDURE — 92083 EXTENDED VISUAL FIELD XM: CPT

## 2023-04-20 ENCOUNTER — APPOINTMENT (OUTPATIENT)
Dept: RHEUMATOLOGY | Facility: CLINIC | Age: 78
End: 2023-04-20
Payer: MEDICARE

## 2023-04-20 VITALS
SYSTOLIC BLOOD PRESSURE: 150 MMHG | HEART RATE: 74 BPM | TEMPERATURE: 98 F | BODY MASS INDEX: 25.19 KG/M2 | WEIGHT: 120 LBS | HEIGHT: 58 IN | DIASTOLIC BLOOD PRESSURE: 69 MMHG

## 2023-04-20 DIAGNOSIS — M81.0 AGE-RELATED OSTEOPOROSIS W/OUT CURRENT PATHOLOGICAL FRACTURE: ICD-10-CM

## 2023-04-20 PROCEDURE — 99214 OFFICE O/P EST MOD 30 MIN: CPT

## 2023-04-20 NOTE — HISTORY OF PRESENT ILLNESS
[___ Week(s) Ago] : [unfilled] week(s) ago [Currently Experiencing] : currently [Arthralgias] : arthralgias [FreeTextEntry1] : s/p 2 stents in place with improvement on cardiac function\par on methotrexate for 4 weeks with no side effects but only taking 1 tablet - \par will increase now to 2 tablets weekly\par ongoing joint pain [RF] : negative rheumatoid factor [TextBox_2] : 2020 [TextBox_42] : methotrexate

## 2023-04-20 NOTE — PHYSICAL EXAM
[General Appearance - Alert] : alert [General Appearance - In No Acute Distress] : in no acute distress [Neck Appearance] : the appearance of the neck was normal [Jugular Venous Distention Increased] : there was no jugular-venous distention [Neck Cervical Mass (___cm)] : no neck mass was observed [Thyroid Diffuse Enlargement] : the thyroid was not enlarged [Thyroid Nodule] : there were no palpable thyroid nodules [Auscultation Breath Sounds / Voice Sounds] : lungs were clear to auscultation bilaterally [Heart Rate And Rhythm] : heart rate was normal and rhythm regular [Heart Sounds] : normal S1 and S2 [Heart Sounds Gallop] : no gallops [Murmurs] : no murmurs [Heart Sounds Pericardial Friction Rub] : no pericardial rub [Full Pulse] : the pedal pulses are present [Edema] : there was no peripheral edema [Bowel Sounds] : normal bowel sounds [Abdomen Soft] : soft [Abdomen Tenderness] : non-tender [Abdomen Mass (___ Cm)] : no abdominal mass palpated [Cervical Lymph Nodes Enlarged Anterior Bilaterally] : anterior cervical [Cervical Lymph Nodes Enlarged Posterior Bilaterally] : posterior cervical [Supraclavicular Lymph Nodes Enlarged Bilaterally] : supraclavicular [No CVA Tenderness] : no ~M costovertebral angle tenderness [No Spinal Tenderness] : no spinal tenderness [Skin Color & Pigmentation] : normal skin color and pigmentation [Skin Turgor] : normal skin turgor [] : no rash [Oriented To Time, Place, And Person] : oriented to person, place, and time [Impaired Insight] : insight and judgment were intact [Affect] : the affect was normal [FreeTextEntry1] : ra changes over the hands,with ulnar deviation and swan neck deformities - shoulder with decreased ROM and  synovitis over the shoulder - all other joints with full ROM

## 2023-04-20 NOTE — CONSULT LETTER
[Dear  ___] : Dear  [unfilled], [Consult Letter:] : I had the pleasure of evaluating your patient, [unfilled]. [Please see my note below.] : Please see my note below. [Consult Closing:] : Thank you very much for allowing me to participate in the care of this patient.  If you have any questions, please do not hesitate to contact me. [Sincerely,] : Sincerely, [FreeTextEntry3] : Angie Alarcon, MSN, ANP-bc/GNP\par Nurse Practitioner\par Division of Rheumatology\par [facility]\par

## 2023-04-23 PROBLEM — M81.0 OSTEOPOROSIS: Status: ACTIVE | Noted: 2022-12-29

## 2023-04-27 ENCOUNTER — APPOINTMENT (OUTPATIENT)
Dept: HEART FAILURE | Facility: CLINIC | Age: 78
End: 2023-04-27
Payer: MEDICARE

## 2023-04-27 ENCOUNTER — NON-APPOINTMENT (OUTPATIENT)
Age: 78
End: 2023-04-27

## 2023-04-27 VITALS
HEIGHT: 58 IN | TEMPERATURE: 98 F | SYSTOLIC BLOOD PRESSURE: 122 MMHG | DIASTOLIC BLOOD PRESSURE: 64 MMHG | BODY MASS INDEX: 24.98 KG/M2 | HEART RATE: 69 BPM | WEIGHT: 119 LBS | OXYGEN SATURATION: 100 %

## 2023-04-27 PROCEDURE — 93000 ELECTROCARDIOGRAM COMPLETE: CPT

## 2023-04-27 PROCEDURE — 99214 OFFICE O/P EST MOD 30 MIN: CPT | Mod: 25

## 2023-04-27 NOTE — DISCUSSION/SUMMARY
[Patient] : the patient [___ Month(s)] : in [unfilled] month(s) [EKG obtained to assist in diagnosis and management of assessed problem(s)] : EKG obtained to assist in diagnosis and management of assessed problem(s) [FreeTextEntry2] : daughter [FreeTextEntry1] : 1. Ischemic cardiomyopathy/chronic  systolic HFrEF ( LVIDD 29% 1/2023),\par -S/P PCI LAD with TOÑA 2/10/23 with RHC- RA 3, PW 11. CI 3.7, SVR 1000, PVR VARGAS 1.8\par - Repeat TTE 3/9/23 with improvement with mod MR and mild mod TR from prior TTE with segmental LV systolic dysfunction with Severe MR, TR and severe pHTN. No structural intervention warranted at this time, for repeat TTE 6 months.\par - continue furosemide to 20 mg QOD and as needed for weight gain of 2-3 lbs in 2-3 days, gaol withg 116-118 lbs\par - continue  Entresto  mg bid, labs 2/2/23 with K 4.7 and BUN/creat 19/0.91 and will obtain recent labs from PCP on increased dose \par - may consider adding MRA spironolactone if K and renal function are stable\par - continue Farxiga 5 mg daily. (States she took Jardiance in the past and didn't feel well so will stop if she is having any adverse symptoms).\par - limit salt to less than 2000 mg per day\par - limit fluid to less than 2 liters per day\par - follow up with Dr. Manriquez\par - will continue optimization of GDMT . No intervention on Mitral valve at this time\par - may try Claritin 10 mg to see if it helps allergy symptoms \par \par 2. CAD/MI ( 2021) - no active chest pain or acute EKG changes\par - RHC/LHC on 2/10/23 and is S/P PCI LAD with TOÑA 2/10/23 with RHC- RA 3, PW 11. CI 3.7, SVR 1000, PVR VARGAS 1.8\par - continue ASA 81 mg daily\par - continue plavix 75 mg daily\par - continue atorvastatin 40 mg qhs\par \par 3. HTN- stable\par -  Entresto as above\par - metoprolol as above\par \par 4. Giant cell arteritis/ RA\par - follow up with rheumatology\par - started on methotrexate 5 mg daily\par \par Follow up in the office in 3 months    MV RADHA with structural heart team not warranted at this time, will follow up in 6 months for TTE

## 2023-04-27 NOTE — HISTORY OF PRESENT ILLNESS
[FreeTextEntry1] : Ms Teresa Jones is an 78 year old female with past medical history of HTN, HLD, ischemic cardiomyopathy/chronic  systolic HFrEF ( LVIDD 29% 1/2023), rheumatoid arthritis, and giant cell arteritis ( loss of vision 2019 left eye),  MI in 2021 for which she had an angiogram at [facility] that demonstrated significant CAD ( as per family record)  but was not revascularized , felt not amenable to PCI . Her LVEF was reportedly 40-45% at that time with TTE 1/17/23 LVEF 29%, severe MR, mod severe TR, severe pHTN  S/P PCI LAD and revascularized with TOÑA 2/10/23 with RHC- RA 3, PW 11. CI 3.7, SVR 1000, PVR VARGAS 1.8.Last admission 2/10-2/12/23 at [facility] for LHC/RHC.  3/9/23 TTE; mod MR and mild to mod TR. Pt is her for a follow up today.  \par \par Pt is accompanied by her daughter for appointment. Since last appt 3/27/23, she has been feeling well and has tolerated the up titration of Entresto to  mg bid. She takes furoemide 20 mg usually every 2-3 days if her weight goes up. Took a dose this week when weight went up to 119 lbs. Admits to a chronic dry cough for a long time that she thinks may be allergies, hesitant to try allergy med. She followed up with her PCP and had labs work done, will request. \par \par Home weight  range 116-119 lbs and is 116  lbs at home today with home B/P range 120 and is 122/64 in office today.  She followed up with structural heart on 3/9/23 and repeat echocardiogram 3/9/23 reviewed with moderate mitral regurgitation and tricuspid regurgitation mild to moderate and does not warrant structural intervention at this time. For repeat TTE in 6 months.\par She followed up with rheumatology for RA/history of giant cell arteritis 2019 and started methotrexate 5 mg daily  for arthralgias with hand deformities. \par   \par Currently, states she is able to  go shopping with her daughter to the stores and can walk in her house room to room without dyspnea.  She is also limited by pain in her knees. She can climb 1 flight os stairs slowly. She sleeps with 2 pillows on her side ( on couch)  and states she has snored for many years but denies orthopnea/PND. She gets up a night to go to the bathroom.  Adhering to low salt diet and is drinking less than 2 liters of fluid per day. \par \par She denies chest pain, palpitations, dizziness/LH and syncope. No falls. Pt does not have an ICD. \par \par \par

## 2023-04-27 NOTE — ASSESSMENT
[FreeTextEntry1] : 78 year old female with past medical history of HTN, HLD, ischemic cardiomyopathy/chronic  systolic HFrEF ( LVIDD 29% 1/2023), rheumatoid arthritis, and giant cell arteritis ( loss of vision 2019 left eye),  MI in 2021 for which she had an angiogram at [facility] that demonstrated significant CAD ( as per family record)  but was not revascularized , felt not amenable to PCI . Her LVEF was reportedly 40-45% at that time with TTE 1/17/23 LVEF 29%, severe MR, mod severe TR, severe pHTN. S/P PCI LAD and revascularized with TOÑA 2/10/23 with RHC- RA 3, PW 11. CI 3.7, SVR 1000, PVR VARGAS 1.8\par ACC/AHA Stage C, NYHA Class III symptoms\par Appears compensated and normotensive

## 2023-04-27 NOTE — CARDIOLOGY SUMMARY
[de-identified] : 4/27/23 NSR 69   with NSST\par 3/27/23 NSR 66   with NSST\par  2/27/23 NSR 66   with NSST with PVC\par 2/2/23 NSR 84   with NSST with PVC's [de-identified] : 3/9/23 echocardiogram  [facility];  moderate mitral regurgitation and tricuspid regurgitation mild to moderate at most. \par TTE 1/17/23 LVEF 29%, severe MR, mod severe TR, severe pHTN  [de-identified] : 2/10/23  S/P PCI LAD with TOÑA with RHC- RA 3, PW 11. CI 3.7, SVR 1000, PVR VARGAS 1.8. \par \par MI in 2021 for which she had an angiogram at [facility] that demonstrated significant CAD (I do not have a report or imaging for review) but was felt not amenable to PC

## 2023-04-27 NOTE — PHYSICAL EXAM
[Well Nourished] : well nourished [No Acute Distress] : no acute distress [Normal S1, S2] : normal S1, S2 [Clear Lung Fields] : clear lung fields [Soft] : abdomen soft [Non Tender] : non-tender [No Edema] : no edema [No Rash] : no rash [Normal] : alert and oriented, normal memory [de-identified] : decreased vision [de-identified] : no carotid bruits appreciated [de-identified] : JVP 7 cm  [de-identified] : RRR  3/6 SM at apex  [de-identified] : decreased at right base [de-identified] : liver tip not palpable [de-identified] : slow gait, multiple hand deformities

## 2023-05-10 ENCOUNTER — RX RENEWAL (OUTPATIENT)
Age: 78
End: 2023-05-10

## 2023-05-17 ENCOUNTER — APPOINTMENT (OUTPATIENT)
Dept: CARE COORDINATION | Facility: HOME HEALTH | Age: 78
End: 2023-05-17
Payer: MEDICARE

## 2023-05-17 DIAGNOSIS — J30.2 OTHER SEASONAL ALLERGIC RHINITIS: ICD-10-CM

## 2023-05-17 DIAGNOSIS — K21.9 GASTRO-ESOPHAGEAL REFLUX DISEASE W/OUT ESOPHAGITIS: ICD-10-CM

## 2023-05-17 DIAGNOSIS — Z13.31 ENCOUNTER FOR SCREENING FOR DEPRESSION: ICD-10-CM

## 2023-05-17 PROCEDURE — 99342 HOME/RES VST NEW LOW MDM 30: CPT | Mod: 95

## 2023-05-17 PROCEDURE — G0444 DEPRESSION SCREEN ANNUAL: CPT | Mod: 26,95

## 2023-05-31 PROBLEM — K21.9 GERD (GASTROESOPHAGEAL REFLUX DISEASE): Status: ACTIVE | Noted: 2023-05-31

## 2023-05-31 PROBLEM — Z13.31 SCREENING FOR DEPRESSION: Status: ACTIVE | Noted: 2023-05-31

## 2023-05-31 PROBLEM — J30.2 SEASONAL ALLERGIES: Status: ACTIVE | Noted: 2023-05-31

## 2023-05-31 RX ORDER — ATORVASTATIN CALCIUM 40 MG/1
40 TABLET, FILM COATED ORAL
Refills: 0 | Status: ACTIVE | COMMUNITY

## 2023-05-31 RX ORDER — FERROUS SULFATE TAB EC 324 MG (65 MG FE EQUIVALENT) 324 (65 FE) MG
324 (65 FE) TABLET DELAYED RESPONSE ORAL
Refills: 0 | Status: ACTIVE | COMMUNITY

## 2023-05-31 RX ORDER — FLUTICASONE PROPIONATE 50 UG/1
50 SPRAY, METERED NASAL
Refills: 0 | Status: ACTIVE | COMMUNITY

## 2023-05-31 RX ORDER — ASPIRIN ENTERIC COATED TABLETS 81 MG 81 MG/1
81 TABLET, DELAYED RELEASE ORAL
Refills: 0 | Status: ACTIVE | COMMUNITY

## 2023-05-31 RX ORDER — METOPROLOL SUCCINATE 25 MG/1
25 TABLET, EXTENDED RELEASE ORAL
Refills: 0 | Status: ACTIVE | COMMUNITY

## 2023-05-31 NOTE — ASSESSMENT
[FreeTextEntry1] : Patient referred to social work for assistance w/ access a ride/medical cab and HHA\par \par Educated about importance of healthy diet, physical activity, proper sleep (8 hours of sleep), importance of screening test for early detection and treatment of cancer. Importance of immunizations including COVID-19 and seasonal flu vaccine in order to prevent or lessen disease.\par \par Patient encounter incorporated clinical review of the medical record including consultation from specialists, review of lab and diagnostic testing with interpretation and discussion of results with patient and/or primary care taker, general patient counseling and coordination of care as well documentation update within the electronic medical record. All patient questions/concerns addressed during time of visit.\par

## 2023-05-31 NOTE — HEALTH RISK ASSESSMENT
[No] : In the past 12 months have you used drugs other than those required for medical reasons? No [No falls in past year] : Patient reported no falls in the past year [0] : 2) Feeling down, depressed, or hopeless: Not at all (0) [PHQ-2 Negative - No further assessment needed] : PHQ-2 Negative - No further assessment needed [I have developed a follow-up plan documented below in the note.] : I have developed a follow-up plan documented below in the note. [Change in mental status noted] : No change in mental status noted [None] : None [Reports changes in hearing] : Reports no changes in hearing [Reports changes in vision] : Reports no changes in vision [Reports normal functional visual acuity (ie: able to read med bottle)] : Reports poor functional visual acuity.  [Reports changes in dental health] : Reports no changes in dental health [Smoke Detector] : smoke detector [Carbon Monoxide Detector] : carbon monoxide detector [Guns at Home] : no guns at home [Seat Belt] :  uses seat belt [TB Exposure] : is not being exposed to tuberculosis

## 2023-05-31 NOTE — HISTORY OF PRESENT ILLNESS
[Home] : at home, [unfilled] , at the time of the visit. [Other Location: e.g. Home (Enter Location, City,State)___] : at [unfilled] [Verbal consent obtained from patient] : the patient, [unfilled] [de-identified] : [facility] quality initiative provider note: 78 year old female with history of anemia, arthritis, CAD, CHF, HTN, HLD, GERD, giant cell arteritis, MI (2021), OP, prediabetes. Patient report doing well. No complaints offered at this time. Awake, alert and oriented x4, in no acute distress. Denies any chest pain, shortness of breath, palpitation or dizziness. Patient verified medications and medical diagnosis. Report taking medications as prescribed.

## 2023-05-31 NOTE — PHYSICAL EXAM
[de-identified] : Telehealth precludes traditional, comprehensive physical exam. Patient appeared stable and alert.

## 2023-06-05 ENCOUNTER — APPOINTMENT (OUTPATIENT)
Dept: CARDIOLOGY | Facility: CLINIC | Age: 78
End: 2023-06-05
Payer: MEDICARE

## 2023-06-05 ENCOUNTER — NON-APPOINTMENT (OUTPATIENT)
Age: 78
End: 2023-06-05

## 2023-06-05 VITALS
HEART RATE: 70 BPM | BODY MASS INDEX: 24.98 KG/M2 | HEIGHT: 58 IN | WEIGHT: 119 LBS | DIASTOLIC BLOOD PRESSURE: 70 MMHG | SYSTOLIC BLOOD PRESSURE: 152 MMHG | TEMPERATURE: 98 F | OXYGEN SATURATION: 100 %

## 2023-06-05 VITALS — DIASTOLIC BLOOD PRESSURE: 68 MMHG | SYSTOLIC BLOOD PRESSURE: 136 MMHG

## 2023-06-05 PROCEDURE — 93000 ELECTROCARDIOGRAM COMPLETE: CPT

## 2023-06-05 PROCEDURE — 99214 OFFICE O/P EST MOD 30 MIN: CPT | Mod: 25

## 2023-06-05 NOTE — PHYSICAL EXAM
[Well Nourished] : well nourished [No Acute Distress] : no acute distress [Frail] : frail [Normal Conjunctiva] : normal conjunctiva [Normal Venous Pressure] : normal venous pressure [No Carotid Bruit] : no carotid bruit [Normal S1, S2] : normal S1, S2 [No Rub] : no rub [No Gallop] : no gallop [Clear Lung Fields] : clear lung fields [Good Air Entry] : good air entry [No Respiratory Distress] : no respiratory distress  [Soft] : abdomen soft [Non Tender] : non-tender [No Masses/organomegaly] : no masses/organomegaly [Normal Bowel Sounds] : normal bowel sounds [Normal Gait] : normal gait [No Edema] : no edema [No Cyanosis] : no cyanosis [No Clubbing] : no clubbing [No Varicosities] : no varicosities [No Rash] : no rash [No Skin Lesions] : no skin lesions [Moves all extremities] : moves all extremities [No Focal Deficits] : no focal deficits [Normal Speech] : normal speech [Alert and Oriented] : alert and oriented [Normal memory] : normal memory [de-identified] : soft systolic murmur at LUSB

## 2023-06-05 NOTE — HISTORY OF PRESENT ILLNESS
[FreeTextEntry1] : 77 yo woman presents for CV followup of heart failure/valve disease.\par She used to be followed at [facility] by Alfredito Dong, Cardiology. She is now living in [place] with daughter Mya (cell [phone]).\par \par H/o anemia\par Elevated ESR, CRP - Giant cell arteritis, diagnosed 2019, presented with partial right eye vision loss\par HTN\par HLD\par Rheumatoid arthritis\par Pre-diabetes\par Heart failure\par H/o MI 2/2021, unable to perform PCI; LVEF 41-45%; no h/o PCI or CABG / revascularization\par MR, TR; followed by structural clinic and HF\par \par February 2023:\par - s/p cardiac cath 2/10: s/p POBA to D1, dLM 70%x 1, pLAD 70% x 1 stents. RFA\par sheath. IABP on LFA to increase coronary perfusion i/s/o large clot burden in\par LCX and Diag. ASA, Plavix loaded. Heparin started in CICU\par -Echo 1/17 showed: EF of 29, severe MR, Moderately dilated left atrium. Mild\par left ventricular enlargement. Severe segmental left ventricular systolic\par dysfunction. No LV thrombus seen. Moderate diastolic dysfunction (Stage II). Right ventricular enlargement\par with decreased right ventricular systolic function. severe pulmonary\par hypertension. Moderate-severe tricuspid regurgitation.\par \par At her last visit with me in Feb 2023, the following issues were discussed:\par CAD in native artery (414.01) (I25.10)\par  · H/o MI; s/p recent PCI during angiogram as part of HF evaluation. Doing well; continue\par     aspirin + Plavix, in addition to other medications.\par CHF (congestive heart failure) (428.0) (I50.9)\par  · Reduced LVEF, likely ischemic in nature. No obvious volume overload at present.\par     Continues with ARNI, bblocker, statin, diuretic, Farxiga. She also has severe MR/TR. No\par     change in rx today; F/u with Dr. Garcia.\par Chronic hypertension (401.9) (I10)\par  · BP stable on present rx. Suggest home monitoring.\par Giant cell arteritis (446.5) (M31.6)\par  · H/o giant cell. Followed by Rheumatology.\par Hyperlipidemia (272.4) (E78.5)\par  · Continue with statin therapy.\par Severe mitral regurgitation (424.0) (I34.0)\par  · Being followed by Structural Heart for possible m-clip and TV repair given HF/p-HTN. Will\par     see in f/u shortly.\par Severe tricuspid regurgitation (397.0) (I07.1) \par \par Labs Feb/March 2023:\par BNP 1305\par CMP normal \par Hb 9.4, HCL 30%\par A1c 5.4%\par \par Echo March 2023:\par LVEF 35% with inf/inf-lateral AK; dilated LV cavity\par Moderate MR, secondary/functional\par Mild aortic stenosis\par Mild/mod TR\par RVSP 38 mmHg\par \par Since her last visit, she has remained stable. She is on methotrexate now for arteritis. No formal exercise; however, during daily activities (cleaning, stairs, shopping), she reports no issues. No significant SOB/SANTAMARIA, chest pain, edema, palpitations, syncope. BP at home consistently 120-130 mmHg. Compliant with medications. 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

## 2023-06-05 NOTE — REVIEW OF SYSTEMS
[Negative] : Heme/Lymph [Fever] : no fever [Chills] : no chills [Feeling Fatigued] : not feeling fatigued [SOB] : no shortness of breath [Dyspnea on exertion] : not dyspnea during exertion [Chest Discomfort] : no chest discomfort [Lower Ext Edema] : no extremity edema [Leg Claudication] : no intermittent leg claudication [Palpitations] : no palpitations [Orthopnea] : no orthopnea [PND] : no PND [Syncope] : no syncope [Wheezing] : no wheezing [Coughing Up Blood] : no hemoptysis

## 2023-06-15 ENCOUNTER — APPOINTMENT (OUTPATIENT)
Dept: RHEUMATOLOGY | Facility: CLINIC | Age: 78
End: 2023-06-15

## 2023-07-24 ENCOUNTER — APPOINTMENT (OUTPATIENT)
Dept: CARDIOLOGY | Facility: CLINIC | Age: 78
End: 2023-07-24

## 2023-07-31 ENCOUNTER — APPOINTMENT (OUTPATIENT)
Dept: HEART FAILURE | Facility: CLINIC | Age: 78
End: 2023-07-31
Payer: MEDICARE

## 2023-07-31 VITALS
SYSTOLIC BLOOD PRESSURE: 138 MMHG | DIASTOLIC BLOOD PRESSURE: 75 MMHG | OXYGEN SATURATION: 100 % | HEART RATE: 62 BPM | HEIGHT: 58 IN | BODY MASS INDEX: 24.98 KG/M2 | WEIGHT: 119 LBS

## 2023-07-31 DIAGNOSIS — Z95.5 PRESENCE OF CORONARY ANGIOPLASTY IMPLANT AND GRAFT: ICD-10-CM

## 2023-07-31 PROCEDURE — 93000 ELECTROCARDIOGRAM COMPLETE: CPT

## 2023-07-31 PROCEDURE — 99214 OFFICE O/P EST MOD 30 MIN: CPT | Mod: 25

## 2023-07-31 RX ORDER — PANTOPRAZOLE 40 MG/1
40 TABLET, DELAYED RELEASE ORAL
Qty: 90 | Refills: 3 | Status: ACTIVE | COMMUNITY
Start: 2022-12-29

## 2023-08-01 PROBLEM — Z95.5 HISTORY OF HEART ARTERY STENT: Status: ACTIVE | Noted: 2023-02-27

## 2023-08-01 NOTE — DISCUSSION/SUMMARY
[Patient] : the patient [___ Month(s)] : in [unfilled] month(s) [FreeTextEntry2] : daughter [FreeTextEntry3] : 4 months [FreeTextEntry1] : 1. Ischemic cardiomyopathy/chronic  systolic HFrEF ( LVIDD 29% 1/2023), -S/P PCI LAD with TOÑA 2/10/23 with RHC- RA 3, PW 11. CI 3.7, SVR 1000, PVR VARGAS 1.8 - Repeat TTE 3/9/23 with improvement with mod MR and mild mod TR from prior TTE with segmental LV systolic dysfunction with Severe MR, TR and severe pHTN. No structural intervention warranted at this time, for repeat TTE 6 months. - continue furosemide to 20 mg QOD and as needed for weight gain of 2-3 lbs in 2-3 days, goal with 116-118 lbs - will hold   Entresto  mg bid for 3 days starting 8/1 and will substitute hydral 50 mg q8 and isosorbide 20 mg q 8 and see if there is any change in the cough. Pt feels better on Entresto but agrees to try and will call to report how she is feeling after 3 days. If no change, she wishes to go back on Entresto - lastlabs 2/2/23 with K 4.7 and BUN/creat 19/0.91 and will obtain recent labs from PCP on increased dose  - may consider adding MRA spironolactone if K and renal function are stable - continue Farxiga 5 mg daily. (States she took Jardiance in the past and didn't feel well so will stop if she is having any adverse symptoms). - limit salt to less than 2000 mg per day - limit fluid to less than 2 liters per day - follow up with Dr. Manriquez - will continue optimization of GDMT . No intervention on Mitral valve at this time - may try Claritin 10 mg to see if it helps allergy symptoms   2. CAD/MI ( 2021) - no active chest pain or acute EKG changes - RHC/LHC on 2/10/23 and is S/P PCI LAD with TOÑA 2/10/23 with RHC- RA 3, PW 11. CI 3.7, SVR 1000, PVR VARGAS 1.8 - continue ASA 81 mg daily - continue plavix 75 mg daily - continue atorvastatin 40 mg qhs  3. HTN- stable -  Entresto as above and will see if she feels better off Entresto and on hydr/iso - metoprolol as above  4. Giant cell arteritis/ RA - follow up with rheumatology - started on methotrexate 5 mg weekly but only taking 2.5 mg as she feels weak when she takes it - daughter will discuss further with rheumatologist  Follow up in the office in 3 months    MV RADHA with structural heart team not warranted at this time, will follow up at [facility] for TTE [EKG obtained to assist in diagnosis and management of assessed problem(s)] : EKG obtained to assist in diagnosis and management of assessed problem(s)

## 2023-08-01 NOTE — CARDIOLOGY SUMMARY
[de-identified] : 7/31/23 NSR 63   with NSST 4/27/23 NSR 69   with NSST 3/27/23 NSR 66   with NSST  2/27/23 NSR 66   with NSST with PVC 2/2/23 NSR 84   with NSST with PVC's [de-identified] : 3/9/23 echocardiogram  [facility];  moderate mitral regurgitation and tricuspid regurgitation mild to moderate at most. \par  TTE 1/17/23 LVEF 29%, severe MR, mod severe TR, severe pHTN  [de-identified] : 2/10/23  S/P PCI LAD with TOÑA with RHC- RA 3, PW 11. CI 3.7, SVR 1000, PVR VARGAS 1.8. \par  \par  MI in 2021 for which she had an angiogram at [facility] that demonstrated significant CAD (I do not have a report or imaging for review) but was felt not amenable to PC

## 2023-08-01 NOTE — PHYSICAL EXAM
[Well Nourished] : well nourished [No Acute Distress] : no acute distress [Normal S1, S2] : normal S1, S2 [Clear Lung Fields] : clear lung fields [Soft] : abdomen soft [Non Tender] : non-tender [No Edema] : no edema [No Rash] : no rash [Normal] : alert and oriented, normal memory [de-identified] : decreased vision [de-identified] : no carotid bruits appreciated [de-identified] : JVP 7 cm  [de-identified] : RRR  3/6 SM at apex  [de-identified] : decreased at right base [de-identified] : liver tip not palpable [de-identified] : slow gait, multiple hand deformities

## 2023-08-01 NOTE — HISTORY OF PRESENT ILLNESS
[FreeTextEntry1] : Ms Teresa Jones is an 78 year old female with past medical history of HTN, HLD, ischemic cardiomyopathy/chronic  systolic HFrEF ( LVIDD 29% 1/2023), rheumatoid arthritis, and giant cell arteritis ( loss of vision 2019 left eye),  MI in 2021 for which she had an angiogram at [facility] that demonstrated significant CAD ( as per family record)  but was not revascularized , felt not amenable to PCI . Her LVEF was reportedly 40-45% at that time with TTE 1/17/23 LVEF 29%, severe MR, mod severe TR, severe pHTN  S/P PCI LAD and revascularized with TOÑA 2/10/23 with RHC- RA 3, PW 11. CI 3.7, SVR 1000, PVR VARGAS 1.8.Last admission 2/10-2/12/23 at [facility] for LHC/RHC.  3/9/23 TTE; mod MR and mild to mod TR. Pt is her for a follow up today.    Pt is accompanied by her daughter for appointment with Dr. Garcia. Since last appt , she has been feeling well and has tolerated the up titration of Entresto to  mg bid. She takes furosemide 20 mg daily and as needed if  weight goes up but weight has been staying in 116 lbs range.  Admits to a chronic dry cough for a long time ( approx 1 year) that she thinks may be allergies but unsure if it is related to starting Entresto., Hesitant to try allergy med. She followed up with her PCP and had labs work done, will request.   Home weight  range  is 115-116  lbs at home today with home B/P range 120 and is 138/75 in office today.  She followed up with structural heart on 3/9/23 and repeat echocardiogram 3/9/23 reviewed with moderate mitral regurgitation and tricuspid regurgitation mild to moderate and does not warrant structural intervention at this time. For repeat TTE in 6 months. She followed up with rheumatology for RA/history of giant cell arteritis 2019 and started methotrexate 5 mg weekly  for arthralgias with hand deformities . She is taking 1 tablet ( 2.5 mg)  weekly instead of 2 as she said it makes her feel weak when she takes it.    Currently, states she is able to  go shopping with her daughter to the stores and can walk in her house room to room without dyspnea.  She is also limited by pain in her knees. She can climb 1 flight of stairs slowly. She sleeps with 2 pillows on her side ( on couch)  and states she has snored for many years but denies orthopnea/PND. She gets up a night to go to the bathroom.  Adhering to low salt diet and is drinking less than 2 liters of fluid per day.   She denies chest pain, palpitations, dizziness/LH and syncope. No falls. Pt does not have an ICD.

## 2023-08-03 ENCOUNTER — APPOINTMENT (OUTPATIENT)
Dept: HEART FAILURE | Facility: CLINIC | Age: 78
End: 2023-08-03

## 2023-08-03 ENCOUNTER — APPOINTMENT (OUTPATIENT)
Dept: RHEUMATOLOGY | Facility: CLINIC | Age: 78
End: 2023-08-03
Payer: MEDICARE

## 2023-08-03 VITALS
SYSTOLIC BLOOD PRESSURE: 139 MMHG | OXYGEN SATURATION: 98 % | BODY MASS INDEX: 24.35 KG/M2 | WEIGHT: 116 LBS | HEIGHT: 58 IN | DIASTOLIC BLOOD PRESSURE: 67 MMHG | HEART RATE: 75 BPM

## 2023-08-03 PROCEDURE — 99214 OFFICE O/P EST MOD 30 MIN: CPT | Mod: 25

## 2023-08-03 PROCEDURE — 20610 DRAIN/INJ JOINT/BURSA W/O US: CPT | Mod: 50

## 2023-08-03 RX ADMIN — METHYLPREDNISOLONE ACETATE MG/ML: 40 INJECTION, SUSPENSION INTRA-ARTICULAR; INTRALESIONAL; INTRAMUSCULAR; SOFT TISSUE at 00:00

## 2023-08-03 NOTE — PROCEDURE
[Today's Date:] : Date: [unfilled] [Arthrocentesis] : arthrocentesis was performed [Patient] : the patient [Risks] : risks [Consent Obtained] : written consent was obtained prior to the procedure and is detailed in the patient's record [Therapeutic] : therapeutic [#1 Site: ______] : #1 site identified in the [unfilled] [Ethyl Chloride] : ethyl chloride [___ ml Inj] : [unfilled] ~Uml [25 gauge 1.5 inch] : A 25 gauge 1.5 inch needle was used [Depomedrol ___ mg] : Depomedrol [unfilled] mg [#2 Site: ___] : # 2 site identified in the [unfilled]

## 2023-08-03 NOTE — ASSESSMENT
[FreeTextEntry1] :  1. RA for many years with multiple hand deformities - ongoing anemia of chronic disease - seronegative with severe and aggressive RA with multiple hand deformities - ongoing pain -on on 2.5 methotrexate -  will try to increase up to 2 tablets weekly - patient is resistant to start medication, counseling to take at least 5 mg weekly 2. 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 CAD with CHF 4. osteoporosis - over the spine -2.5 - osteopenia over the hip - patient wants to defer medication at a time 5. moderate bilateral knee OA -bilateral knee injection today   I reviewed previous labs results with patients. Diagnosis and Prognosis discussed Continue with current medications medications refilled education provided on methotrexate F/u 2 months

## 2023-08-03 NOTE — PHYSICAL EXAM
[General Appearance - Alert] : alert [General Appearance - In No Acute Distress] : in no acute distress [Neck Appearance] : the appearance of the neck was normal [Neck Cervical Mass (___cm)] : no neck mass was observed [Jugular Venous Distention Increased] : there was no jugular-venous distention [Thyroid Diffuse Enlargement] : the thyroid was not enlarged [Thyroid Nodule] : there were no palpable thyroid nodules [Auscultation Breath Sounds / Voice Sounds] : lungs were clear to auscultation bilaterally [Heart Rate And Rhythm] : heart rate was normal and rhythm regular [Heart Sounds] : normal S1 and S2 [Heart Sounds Gallop] : no gallops [Murmurs] : no murmurs [Heart Sounds Pericardial Friction Rub] : no pericardial rub [Full Pulse] : the pedal pulses are present [Edema] : there was no peripheral edema [Bowel Sounds] : normal bowel sounds [Abdomen Soft] : soft [Abdomen Tenderness] : non-tender [Abdomen Mass (___ Cm)] : no abdominal mass palpated [Cervical Lymph Nodes Enlarged Posterior Bilaterally] : posterior cervical [Cervical Lymph Nodes Enlarged Anterior Bilaterally] : anterior cervical [Supraclavicular Lymph Nodes Enlarged Bilaterally] : supraclavicular [No CVA Tenderness] : no ~M costovertebral angle tenderness [No Spinal Tenderness] : no spinal tenderness [Skin Turgor] : normal skin turgor [Skin Color & Pigmentation] : normal skin color and pigmentation [] : no rash [Oriented To Time, Place, And Person] : oriented to person, place, and time [Impaired Insight] : insight and judgment were intact [Affect] : the affect was normal [FreeTextEntry1] : ra changes over the hands,with ulnar deviation and swan neck deformities - shoulder with decreased ROM and  synovitis over the shoulder - all other joints with full ROM

## 2023-08-03 NOTE — HISTORY OF PRESENT ILLNESS
[___ Month(s) Ago] : [unfilled] month(s) ago [Currently Experiencing] : currently [Arthralgias] : arthralgias [FreeTextEntry1] : heart is stable on methotrexate 2.5 mg only - has not increase to 2 tablets weekly no improvement on her pain ongoing knee pain with difficulty going up or down the stairs. x-rays with mild to moderate OA bilaterally - worse over the left with severe OA had labs done 1 week ago at the PCP [RF] : negative rheumatoid factor [TextBox_2] : 2020 [TextBox_42] : methotrexate

## 2023-08-08 ENCOUNTER — NON-APPOINTMENT (OUTPATIENT)
Age: 78
End: 2023-08-08

## 2023-08-15 ENCOUNTER — RX RENEWAL (OUTPATIENT)
Age: 78
End: 2023-08-15

## 2023-09-15 ENCOUNTER — RX RENEWAL (OUTPATIENT)
Age: 78
End: 2023-09-15

## 2023-09-16 PROBLEM — R73.03 PREDIABETES: Status: ACTIVE | Noted: 2022-08-26

## 2023-09-18 ENCOUNTER — APPOINTMENT (OUTPATIENT)
Dept: CARDIOLOGY | Facility: CLINIC | Age: 78
End: 2023-09-18
Payer: MEDICARE

## 2023-09-18 DIAGNOSIS — R73.03 PREDIABETES.: ICD-10-CM

## 2023-09-21 ENCOUNTER — APPOINTMENT (OUTPATIENT)
Dept: OPHTHALMOLOGY | Facility: CLINIC | Age: 78
End: 2023-09-21

## 2023-10-02 ENCOUNTER — APPOINTMENT (OUTPATIENT)
Dept: CARDIOLOGY | Facility: CLINIC | Age: 78
End: 2023-10-02
Payer: MEDICARE

## 2023-10-02 VITALS
OXYGEN SATURATION: 99 % | SYSTOLIC BLOOD PRESSURE: 147 MMHG | HEIGHT: 58 IN | HEART RATE: 80 BPM | DIASTOLIC BLOOD PRESSURE: 72 MMHG

## 2023-10-02 VITALS — SYSTOLIC BLOOD PRESSURE: 135 MMHG | DIASTOLIC BLOOD PRESSURE: 74 MMHG

## 2023-10-02 DIAGNOSIS — I07.1 RHEUMATIC TRICUSPID INSUFFICIENCY: ICD-10-CM

## 2023-10-02 DIAGNOSIS — I21.9 ACUTE MYOCARDIAL INFARCTION, UNSPECIFIED: ICD-10-CM

## 2023-10-02 DIAGNOSIS — I34.0 NONRHEUMATIC MITRAL (VALVE) INSUFFICIENCY: ICD-10-CM

## 2023-10-02 PROCEDURE — 99214 OFFICE O/P EST MOD 30 MIN: CPT | Mod: 25

## 2023-10-02 PROCEDURE — 93000 ELECTROCARDIOGRAM COMPLETE: CPT

## 2023-10-02 PROCEDURE — G0404: CPT

## 2023-10-02 RX ORDER — CLOPIDOGREL BISULFATE 75 MG/1
75 TABLET, FILM COATED ORAL DAILY
Qty: 90 | Refills: 3 | Status: DISCONTINUED | COMMUNITY
End: 2023-10-02

## 2023-10-05 ENCOUNTER — APPOINTMENT (OUTPATIENT)
Dept: CARDIOTHORACIC SURGERY | Facility: CLINIC | Age: 78
End: 2023-10-05
Payer: MEDICARE

## 2023-10-05 ENCOUNTER — OUTPATIENT (OUTPATIENT)
Dept: OUTPATIENT SERVICES | Facility: HOSPITAL | Age: 78
LOS: 1 days | End: 2023-10-05
Payer: COMMERCIAL

## 2023-10-05 VITALS
DIASTOLIC BLOOD PRESSURE: 73 MMHG | BODY MASS INDEX: 23.18 KG/M2 | RESPIRATION RATE: 16 BRPM | HEART RATE: 66 BPM | SYSTOLIC BLOOD PRESSURE: 125 MMHG | OXYGEN SATURATION: 98 % | WEIGHT: 115 LBS | HEIGHT: 59 IN

## 2023-10-05 DIAGNOSIS — I35.0 NONRHEUMATIC AORTIC (VALVE) STENOSIS: ICD-10-CM

## 2023-10-05 PROCEDURE — 99213 OFFICE O/P EST LOW 20 MIN: CPT

## 2023-10-05 PROCEDURE — 93306 TTE W/DOPPLER COMPLETE: CPT | Mod: 26

## 2023-10-05 PROCEDURE — 93306 TTE W/DOPPLER COMPLETE: CPT

## 2023-10-05 RX ORDER — ISOSORBIDE DINITRATE 20 MG/1
20 TABLET ORAL EVERY 8 HOURS
Qty: 90 | Refills: 2 | Status: DISCONTINUED | COMMUNITY
Start: 2023-07-31 | End: 2023-10-05

## 2023-10-05 RX ORDER — PANTOPRAZOLE 40 MG/1
40 TABLET, DELAYED RELEASE ORAL
Refills: 0 | Status: DISCONTINUED | COMMUNITY
Start: 2023-08-03 | End: 2023-10-05

## 2023-10-05 RX ORDER — HYDRALAZINE HYDROCHLORIDE 50 MG/1
50 TABLET ORAL
Qty: 90 | Refills: 3 | Status: DISCONTINUED | COMMUNITY
Start: 2023-07-31 | End: 2023-10-05

## 2023-10-30 ENCOUNTER — NON-APPOINTMENT (OUTPATIENT)
Age: 78
End: 2023-10-30

## 2023-10-30 ENCOUNTER — APPOINTMENT (OUTPATIENT)
Dept: OPHTHALMOLOGY | Facility: CLINIC | Age: 78
End: 2023-10-30
Payer: MEDICARE

## 2023-10-30 PROCEDURE — 92014 COMPRE OPH EXAM EST PT 1/>: CPT | Mod: 25

## 2023-10-30 PROCEDURE — 76514 ECHO EXAM OF EYE THICKNESS: CPT

## 2023-10-30 PROCEDURE — 92133 CPTRZD OPH DX IMG PST SGM ON: CPT

## 2023-10-30 PROCEDURE — 92136 OPHTHALMIC BIOMETRY: CPT

## 2023-11-01 ENCOUNTER — RX RENEWAL (OUTPATIENT)
Age: 78
End: 2023-11-01

## 2023-11-01 RX ORDER — FUROSEMIDE 20 MG/1
20 TABLET ORAL
Qty: 60 | Refills: 2 | Status: ACTIVE | COMMUNITY
Start: 1900-01-01 | End: 1900-01-01

## 2023-11-02 ENCOUNTER — APPOINTMENT (OUTPATIENT)
Dept: RHEUMATOLOGY | Facility: CLINIC | Age: 78
End: 2023-11-02
Payer: MEDICARE

## 2023-11-02 VITALS
TEMPERATURE: 97.1 F | HEART RATE: 68 BPM | BODY MASS INDEX: 23.18 KG/M2 | SYSTOLIC BLOOD PRESSURE: 121 MMHG | OXYGEN SATURATION: 98 % | DIASTOLIC BLOOD PRESSURE: 54 MMHG | WEIGHT: 115 LBS | RESPIRATION RATE: 16 BRPM | HEIGHT: 59 IN

## 2023-11-02 PROCEDURE — 99214 OFFICE O/P EST MOD 30 MIN: CPT | Mod: 25

## 2023-11-02 PROCEDURE — 20610 DRAIN/INJ JOINT/BURSA W/O US: CPT | Mod: 50

## 2023-11-02 RX ORDER — METHYLPRED ACET/NACL,ISO-OS/PF 40 MG/ML
40 VIAL (ML) INJECTION
Qty: 1 | Refills: 0 | Status: COMPLETED | OUTPATIENT
Start: 2023-08-03

## 2023-11-07 ENCOUNTER — NON-APPOINTMENT (OUTPATIENT)
Age: 78
End: 2023-11-07

## 2023-11-27 ENCOUNTER — APPOINTMENT (OUTPATIENT)
Dept: HEART FAILURE | Facility: CLINIC | Age: 78
End: 2023-11-27
Payer: MEDICARE

## 2023-11-27 ENCOUNTER — NON-APPOINTMENT (OUTPATIENT)
Age: 78
End: 2023-11-27

## 2023-11-27 VITALS
TEMPERATURE: 98.3 F | OXYGEN SATURATION: 99 % | DIASTOLIC BLOOD PRESSURE: 56 MMHG | HEART RATE: 79 BPM | BODY MASS INDEX: 23.59 KG/M2 | WEIGHT: 117 LBS | HEIGHT: 59 IN | SYSTOLIC BLOOD PRESSURE: 131 MMHG

## 2023-11-27 DIAGNOSIS — I10 ESSENTIAL (PRIMARY) HYPERTENSION: ICD-10-CM

## 2023-11-27 DIAGNOSIS — I50.9 HEART FAILURE, UNSPECIFIED: ICD-10-CM

## 2023-11-27 DIAGNOSIS — I34.0 NONRHEUMATIC MITRAL (VALVE) INSUFFICIENCY: ICD-10-CM

## 2023-11-27 DIAGNOSIS — E78.5 HYPERLIPIDEMIA, UNSPECIFIED: ICD-10-CM

## 2023-11-27 DIAGNOSIS — I25.10 ATHEROSCLEROTIC HEART DISEASE OF NATIVE CORONARY ARTERY W/OUT ANGINA PECTORIS: ICD-10-CM

## 2023-11-27 PROCEDURE — 93000 ELECTROCARDIOGRAM COMPLETE: CPT

## 2023-11-27 PROCEDURE — 99214 OFFICE O/P EST MOD 30 MIN: CPT | Mod: 25

## 2023-12-18 ENCOUNTER — APPOINTMENT (OUTPATIENT)
Dept: OPHTHALMOLOGY | Facility: AMBULATORY SURGERY CENTER | Age: 78
End: 2023-12-18
Payer: MEDICARE

## 2023-12-18 PROCEDURE — 66984 XCAPSL CTRC RMVL W/O ECP: CPT | Mod: LT

## 2023-12-19 ENCOUNTER — NON-APPOINTMENT (OUTPATIENT)
Age: 78
End: 2023-12-19

## 2023-12-19 ENCOUNTER — APPOINTMENT (OUTPATIENT)
Dept: OPHTHALMOLOGY | Facility: CLINIC | Age: 78
End: 2023-12-19
Payer: MEDICARE

## 2023-12-19 PROCEDURE — 99024 POSTOP FOLLOW-UP VISIT: CPT

## 2023-12-26 ENCOUNTER — APPOINTMENT (OUTPATIENT)
Dept: OPHTHALMOLOGY | Facility: CLINIC | Age: 78
End: 2023-12-26
Payer: MEDICARE

## 2023-12-26 ENCOUNTER — NON-APPOINTMENT (OUTPATIENT)
Age: 78
End: 2023-12-26

## 2023-12-26 PROCEDURE — 99024 POSTOP FOLLOW-UP VISIT: CPT

## 2024-01-15 ENCOUNTER — RX RENEWAL (OUTPATIENT)
Age: 79
End: 2024-01-15

## 2024-01-15 RX ORDER — FOLIC ACID 1 MG/1
1 TABLET ORAL DAILY
Qty: 90 | Refills: 2 | Status: ACTIVE | COMMUNITY
Start: 2023-03-23 | End: 1900-01-01

## 2024-03-13 ENCOUNTER — RX RENEWAL (OUTPATIENT)
Age: 79
End: 2024-03-13

## 2024-04-04 ENCOUNTER — OUTPATIENT (OUTPATIENT)
Dept: OUTPATIENT SERVICES | Facility: HOSPITAL | Age: 79
LOS: 1 days | End: 2024-04-04
Payer: COMMERCIAL

## 2024-04-04 ENCOUNTER — RESULT REVIEW (OUTPATIENT)
Age: 79
End: 2024-04-04

## 2024-04-04 ENCOUNTER — APPOINTMENT (OUTPATIENT)
Dept: CARDIOTHORACIC SURGERY | Facility: CLINIC | Age: 79
End: 2024-04-04
Payer: MEDICARE

## 2024-04-04 VITALS
TEMPERATURE: 98.7 F | HEART RATE: 60 BPM | DIASTOLIC BLOOD PRESSURE: 80 MMHG | OXYGEN SATURATION: 99 % | SYSTOLIC BLOOD PRESSURE: 153 MMHG | RESPIRATION RATE: 16 BRPM

## 2024-04-04 DIAGNOSIS — I35.0 NONRHEUMATIC AORTIC (VALVE) STENOSIS: ICD-10-CM

## 2024-04-04 PROCEDURE — 93306 TTE W/DOPPLER COMPLETE: CPT

## 2024-04-04 PROCEDURE — 93356 MYOCRD STRAIN IMG SPCKL TRCK: CPT

## 2024-04-04 PROCEDURE — 99213 OFFICE O/P EST LOW 20 MIN: CPT

## 2024-04-04 PROCEDURE — 93306 TTE W/DOPPLER COMPLETE: CPT | Mod: 26

## 2024-04-04 RX ORDER — METHOTREXATE 2.5 MG/1
2.5 TABLET ORAL
Qty: 30 | Refills: 2 | Status: COMPLETED | COMMUNITY
Start: 2023-03-23 | End: 2024-04-04

## 2024-04-04 NOTE — ASSESSMENT
[FreeTextEntry1] : Ms. Jones returns for follow up evaluation of her valvular disease. Her symptoms are stable. Her BP was elevated today but she feels that this is due to the stress of getting to this appointment without the assistance of her daughter. She had an echocardiogram today which preliminarily appears very similar to the one done in October 2023 with mild TR and mild-moderate MR as well as basal wall motion abnormalities. She states that it has been difficult to maintain all her appointments as she relies on her daughter for transportation. In light of her stable valvular disease and her close care with both cardiology (Dr. Manriquez) and heart failure (Dr. Garcia), she does not need to follow up with our office. If future echocardiograms show worsening valvular disease, she will be referred back for re-evaluation. No medication changes were made today.  I discussed the above plan with Ms. Jones and she understands and is in full agreement.

## 2024-04-04 NOTE — PHYSICAL EXAM
[Sclera] : the sclera and conjunctiva were normal [PERRL With Normal Accommodation] : pupils were equal in size, round, and reactive to light [Extraocular Movements] : extraocular movements were intact [Neck Appearance] : the appearance of the neck was normal [Neck Cervical Mass (___cm)] : no neck mass was observed [Jugular Venous Distention Increased] : there was no jugular-venous distention [Thyroid Diffuse Enlargement] : the thyroid was not enlarged [Thyroid Nodule] : there were no palpable thyroid nodules [Auscultation Breath Sounds / Voice Sounds] : lungs were clear to auscultation bilaterally [Heart Rate And Rhythm] : heart rate was normal and rhythm regular [Heart Sounds] : normal S1 and S2 [Heart Sounds Gallop] : no gallops [Murmurs] : no murmurs [Heart Sounds Pericardial Friction Rub] : no pericardial rub [2+] : left 2+ [Bowel Sounds] : normal bowel sounds [Abdomen Soft] : soft [Abdomen Tenderness] : non-tender [] : no hepato-splenomegaly [Abdomen Mass (___ Cm)] : no abdominal mass palpated [Deep Tendon Reflexes (DTR)] : deep tendon reflexes were 2+ and symmetric [Sensation] : the sensory exam was normal to light touch and pinprick [No Focal Deficits] : no focal deficits [Oriented To Time, Place, And Person] : oriented to person, place, and time [Impaired Insight] : insight and judgment were intact [Affect] : the affect was normal

## 2024-04-04 NOTE — HISTORY OF PRESENT ILLNESS
[FreeTextEntry1] : Ms. Jones returns today for follow up evaluation of her mitral and tricuspid disease. She reports that she has been feeling well overall and primarily complains of hand pains due to her arthritis. She denies worsening dyspnea on exertion as well as chest pain and lightheadedness. Her weight has been stable since her last visit. She follows with Dr. Marcos Garcia for her heart failure and has an appointment in May. She follows up with Dr. Manriquez for her general cardiology care and states she has an upcoming appointment with his office as well.

## 2024-04-10 ENCOUNTER — RX RENEWAL (OUTPATIENT)
Age: 79
End: 2024-04-10

## 2024-04-10 RX ORDER — SACUBITRIL AND VALSARTAN 97; 103 MG/1; MG/1
97-103 TABLET, FILM COATED ORAL
Qty: 60 | Refills: 3 | Status: ACTIVE | COMMUNITY
Start: 2023-08-15 | End: 1900-01-01

## 2024-05-02 ENCOUNTER — APPOINTMENT (OUTPATIENT)
Dept: RHEUMATOLOGY | Facility: CLINIC | Age: 79
End: 2024-05-02
Payer: MEDICARE

## 2024-05-02 VITALS
HEART RATE: 68 BPM | SYSTOLIC BLOOD PRESSURE: 137 MMHG | DIASTOLIC BLOOD PRESSURE: 65 MMHG | WEIGHT: 117 LBS | OXYGEN SATURATION: 98 % | BODY MASS INDEX: 23.59 KG/M2 | HEIGHT: 59 IN | RESPIRATION RATE: 16 BRPM

## 2024-05-02 DIAGNOSIS — M06.9 RHEUMATOID ARTHRITIS, UNSPECIFIED: ICD-10-CM

## 2024-05-02 DIAGNOSIS — N18.31 CHRONIC KIDNEY DISEASE, STAGE 3A: ICD-10-CM

## 2024-05-02 DIAGNOSIS — M31.6 OTHER GIANT CELL ARTERITIS: ICD-10-CM

## 2024-05-02 DIAGNOSIS — M17.0 BILATERAL PRIMARY OSTEOARTHRITIS OF KNEE: ICD-10-CM

## 2024-05-02 PROCEDURE — 99214 OFFICE O/P EST MOD 30 MIN: CPT

## 2024-05-02 PROCEDURE — G2211 COMPLEX E/M VISIT ADD ON: CPT

## 2024-05-02 RX ORDER — METHOTREXATE 2.5 MG/1
2.5 TABLET ORAL
Qty: 30 | Refills: 2 | Status: ACTIVE | COMMUNITY
Start: 2024-03-13 | End: 1900-01-01

## 2024-05-02 RX ORDER — DAPAGLIFLOZIN 5 MG/1
5 TABLET, FILM COATED ORAL
Qty: 30 | Refills: 6 | Status: ACTIVE | COMMUNITY
Start: 2023-02-02 | End: 1900-01-01

## 2024-05-02 NOTE — PHYSICAL EXAM

## 2024-05-02 NOTE — HISTORY OF PRESENT ILLNESS
[___ Month(s) Ago] : [unfilled] month(s) ago [Currently Experiencing] : currently [Arthralgias] : arthralgias [FreeTextEntry1] : heart is stable. on methotrexate 2.5 mg only -still on 2 tablets weekly no improvement on her pain knee injection helped significantly -  has less pain and able to walk more.  - injection done in 08/2023 given iron infusions for anemia - last iron check 1 year ago that was normal had cataract surgery done since the last visit.  had labs done at the PCP - pending results - will see pcp tomorrow.   [RF] : negative rheumatoid factor [TextBox_2] : 2020 [TextBox_42] : methotrexate

## 2024-05-02 NOTE — ASSESSMENT
[FreeTextEntry1] : 1. RA for many years with multiple hand deformities - ongoing anemia of chronic disease - seronegative with severe and aggressive RA with multiple hand deformities - ongoing pain -on on 2.5 methotrexate - on 5 only but ongoing joint pain -refused to increase dose - continue with 5 mg weekly -  2. 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 CAD with CHF 4. osteoporosis - over the spine -2.5 - osteopenia over the hip - patient wants to defer medication at a time - repeat 01/2025 5. moderate bilateral knee OA -better since the injection - defer injection today   I reviewed previous labs results with patients. had labs done outside Diagnosis and Prognosis discussed Continue with current medications medications refilled F/u 3 months

## 2024-05-06 PROBLEM — M06.9 ARTHRITIS, RHEUMATOID: Status: ACTIVE | Noted: 2022-12-29

## 2024-05-06 PROBLEM — N18.31 STAGE 3A CHRONIC KIDNEY DISEASE: Status: ACTIVE | Noted: 2023-05-31

## 2024-05-06 PROBLEM — M31.6 GIANT CELL ARTERITIS: Status: ACTIVE | Noted: 2022-08-29

## 2024-05-06 PROBLEM — M17.0 ARTHRITIS OF BOTH KNEES: Status: ACTIVE | Noted: 2022-12-29

## 2024-05-12 NOTE — ASSESSMENT
Labs reviewed from 5/2024 hemoglobin was normal.    [FreeTextEntry1] : \par \par 1. RA for many years with multiple hand deformities - ongoing anemia of chronic disease - seronegative with severe and aggressive RA with multiple hand deformities - ongoing pain -on on 2.5 methotrexate -  will try to increase up to 3 tablets weekly\par 2. 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 CAD with CHF\par 4. osteoporosis - over the spine -2.5 - osteopenia over the hip - patient wants to defer medication at a time\par 5. moderate bilateral knee OA - defer injection or referral to PT at this time\par \par \par \par I reviewed previous labs results with patients.\par Diagnosis and Prognosis discussed\par Continue with current medications\par medications refilled\par education provided on methotrexate\par F/u 2 months\par \par \par

## 2024-07-01 ENCOUNTER — APPOINTMENT (OUTPATIENT)
Dept: CV DIAGNOSITCS | Facility: HOSPITAL | Age: 79
End: 2024-07-01

## 2024-07-22 ENCOUNTER — APPOINTMENT (OUTPATIENT)
Dept: CV DIAGNOSITCS | Facility: HOSPITAL | Age: 79
End: 2024-07-22

## 2024-07-22 ENCOUNTER — RESULT REVIEW (OUTPATIENT)
Age: 79
End: 2024-07-22

## 2024-07-22 ENCOUNTER — NON-APPOINTMENT (OUTPATIENT)
Age: 79
End: 2024-07-22

## 2024-07-22 ENCOUNTER — APPOINTMENT (OUTPATIENT)
Dept: HEART FAILURE | Facility: CLINIC | Age: 79
End: 2024-07-22
Payer: MEDICARE

## 2024-07-22 VITALS
WEIGHT: 116 LBS | BODY MASS INDEX: 23.39 KG/M2 | HEIGHT: 59 IN | DIASTOLIC BLOOD PRESSURE: 67 MMHG | OXYGEN SATURATION: 100 % | HEART RATE: 69 BPM | SYSTOLIC BLOOD PRESSURE: 119 MMHG

## 2024-07-22 DIAGNOSIS — I25.10 ATHEROSCLEROTIC HEART DISEASE OF NATIVE CORONARY ARTERY W/OUT ANGINA PECTORIS: ICD-10-CM

## 2024-07-22 PROCEDURE — 93000 ELECTROCARDIOGRAM COMPLETE: CPT

## 2024-07-22 PROCEDURE — 99214 OFFICE O/P EST MOD 30 MIN: CPT | Mod: 25

## 2024-07-22 NOTE — ASSESSMENT
[FreeTextEntry1] : CAD with mod LV dysfnc. ACC/AHA Stage C, NYHA Class II symptoms. No signs of fluid OL.

## 2024-07-22 NOTE — CARDIOLOGY SUMMARY
[de-identified] : 11/27/23 NSR 72, NSST 7/31/23 NSR 63   with NSST 4/27/23 NSR 69   with NSST 3/27/23 NSR 66   with NSST  2/27/23 NSR 66   with NSST with PVC 2/2/23 NSR 84   with NSST with PVC's [de-identified] : 10/5/23 TTE LVEF 45%, mild LV systolic dysfunction, mild mod MR, mild mod  3/9/23 echocardiogram  [facility];  moderate mitral regurgitation and tricuspid regurgitation mild to moderate at most.  TTE 1/17/23 LVEF 29%, severe MR, mod severe TR, severe pHTN  [de-identified] : 2/10/23  S/P PCI LAD with TOÑA with RHC- RA 3, PW 11. CI 3.7, SVR 1000, PVR VARGAS 1.8. \par  \par  MI in 2021 for which she had an angiogram at [facility] that demonstrated significant CAD (I do not have a report or imaging for review) but was felt not amenable to PC

## 2024-07-22 NOTE — HISTORY OF PRESENT ILLNESS
[FreeTextEntry1] : 78F HTN, HLD, ischemic cardiomyopathy/ chronic systolic HFrEF (LV 29% 1/2023-> 45% 10/5/23), rheumatoid arthritis, and giant cell arteritis (loss of vision 2019 right eye), MI in 2021, s/p PCI of LAD 2/10/23 with RHC- RA 3, PW 11. CI 3.7, SVR 1000, PVR VARGAS 1.8. Had successful cataract surgery.  She has been feeling well and has tolerated the up titration of GDMT including Entresto to  mg bid, Farxiga 5 mg daily, metoprolol 25 mg daily and furosemide 20 mg qd. She denies orthopnea and PND. Can easily climb a flight of stairs. Last echo showed only mod LV dysfnc with mild MR.

## 2024-07-22 NOTE — DISCUSSION/SUMMARY
[Patient] : the patient [EKG obtained to assist in diagnosis and management of assessed problem(s)] : EKG obtained to assist in diagnosis and management of assessed problem(s) [FreeTextEntry3] : 6 months [FreeTextEntry1] : Ischemic cardiomyopathy/chronic systolic HFrEF -S/P PCI LAD with TOÑA 2/10/23 with RHC- RA 3, PW 11. CI 3.7, SVR 1000, PVR VARGAS 1.8 - Repeat TTE  10/5/23 with improvement with EF 45% and mild mod MR and TR. No structural intervention warranted at this time. - Continue current GDMT. - Follow up with Dr. Manriquez.  RTO PRN.

## 2024-07-22 NOTE — PHYSICAL EXAM
[Well Nourished] : well nourished [No Acute Distress] : no acute distress [Normal Venous Pressure] : normal venous pressure [No Carotid Bruit] : no carotid bruit [Normal S1, S2] : normal S1, S2 [No Gallop] : no gallop [Clear Lung Fields] : clear lung fields [No Respiratory Distress] : no respiratory distress  [Soft] : abdomen soft [Non Tender] : non-tender [No Edema] : no edema [Moves all extremities] : moves all extremities [Alert and Oriented] : alert and oriented [de-identified] : slow gait, multiple hand deformities

## 2024-08-26 ENCOUNTER — APPOINTMENT (OUTPATIENT)
Dept: CARDIOLOGY | Facility: CLINIC | Age: 79
End: 2024-08-26
Payer: MEDICARE

## 2024-08-26 ENCOUNTER — APPOINTMENT (OUTPATIENT)
Dept: ELECTROPHYSIOLOGY | Facility: CLINIC | Age: 79
End: 2024-08-26
Payer: MEDICARE

## 2024-08-26 VITALS
HEIGHT: 59 IN | HEART RATE: 65 BPM | WEIGHT: 118 LBS | SYSTOLIC BLOOD PRESSURE: 115 MMHG | OXYGEN SATURATION: 100 % | BODY MASS INDEX: 23.79 KG/M2 | DIASTOLIC BLOOD PRESSURE: 65 MMHG

## 2024-08-26 DIAGNOSIS — I50.9 HEART FAILURE, UNSPECIFIED: ICD-10-CM

## 2024-08-26 DIAGNOSIS — I10 ESSENTIAL (PRIMARY) HYPERTENSION: ICD-10-CM

## 2024-08-26 DIAGNOSIS — I25.10 ATHEROSCLEROTIC HEART DISEASE OF NATIVE CORONARY ARTERY W/OUT ANGINA PECTORIS: ICD-10-CM

## 2024-08-26 DIAGNOSIS — M31.6 OTHER GIANT CELL ARTERITIS: ICD-10-CM

## 2024-08-26 DIAGNOSIS — I50.20 UNSPECIFIED SYSTOLIC (CONGESTIVE) HEART FAILURE: ICD-10-CM

## 2024-08-26 DIAGNOSIS — I34.0 NONRHEUMATIC MITRAL (VALVE) INSUFFICIENCY: ICD-10-CM

## 2024-08-26 DIAGNOSIS — N18.31 CHRONIC KIDNEY DISEASE, STAGE 3A: ICD-10-CM

## 2024-08-26 DIAGNOSIS — R73.03 PREDIABETES.: ICD-10-CM

## 2024-08-26 DIAGNOSIS — I07.1 RHEUMATIC TRICUSPID INSUFFICIENCY: ICD-10-CM

## 2024-08-26 DIAGNOSIS — E78.5 HYPERLIPIDEMIA, UNSPECIFIED: ICD-10-CM

## 2024-08-26 PROCEDURE — 99214 OFFICE O/P EST MOD 30 MIN: CPT

## 2024-08-26 PROCEDURE — 99205 OFFICE O/P NEW HI 60 MIN: CPT | Mod: 25

## 2024-08-26 PROCEDURE — G2211 COMPLEX E/M VISIT ADD ON: CPT

## 2024-08-26 PROCEDURE — 93000 ELECTROCARDIOGRAM COMPLETE: CPT

## 2024-08-26 NOTE — HISTORY OF PRESENT ILLNESS
[FreeTextEntry1] : Teresa Leung is an 80 y/o woman with Hx of HTN, HLD, ischemic cardiomyopathy/ chronic systolic HFrEF 32% ( 7/2024) ), rheumatoid arthritis, and giant cell arteritis (loss of vision 2019 right eye), MI in 2021, s/p PCI of LAD 2/10/23 who is here for an initial evaluation to discuss implantation of ICD. Pt is with her daughter today for a cardiology appt after recent TTE 7/2024 revealed EF 32% despite GDMT. Has been feeling well, stable dyspnea on exertion. Denies chest pain, palpitations, SOB at rest, syncope or near syncope.

## 2024-08-26 NOTE — HISTORY OF PRESENT ILLNESS
[FreeTextEntry1] : 78 yo woman presents for CV follow-up of heart failure/valve disease.  Previously followed at [facility] by Alfredito Dong, Cardiology. She is now living in [place] with daughter Mya (cell [phone]).  H/o anemia Elevated ESR, CRP - Giant cell arteritis, diagnosed 2019, presented with partial right eye vision loss HTN HLD Rheumatoid arthritis Pre-diabetes Heart failure H/o MI 2/2021, unable to perform PCI; LVEF 41-45%; no h/o PCI or CABG / revascularization MR, TR; followed by structural clinic and HF  February 2023: - s/p cardiac cath 2/10: s/p POBA to D1, dLM 70%x 1, pLAD 70% x 1 stents. RFA sheath. IABP on LFA to increase coronary perfusion i/s/o large clot burden in LCX and Diag. ASA, Plavix loaded. Heparin started in CICU -Echo 1/17/2023 showed: EF of 29, severe MR, Moderately dilated left atrium. Mild left ventricular enlargement. Severe segmental left ventricular systolic dysfunction. No LV thrombus seen. Moderate diastolic dysfunction (Stage II). Right ventricular enlargement with decreased right ventricular systolic function. severe pulmonary hypertension. Moderate-severe tricuspid regurgitation.  Labs Feb/March 2023: BNP 1305 CMP normal  Hb 9.4, HCL 30% A1c 5.4%  Echo March 2023: LVEF 35% with inf/inf-lateral AK; dilated LV cavity Moderate MR, secondary/functional Mild aortic stenosis Mild/mod TR RVSP 38 mmHg  At her last visit with me in October 2023, the following issues were discussed: CAD in native artery (414.01) (I25.10) H/o MI; s/p PCI during angiogram as part of HF evaluation. Doing well; continue aspirin,     in addition to other medications. No angina. Will d/c clopidogrel at this time. CHF (congestive heart failure) (428.0) (I50.9) Reduced LVEF, likely ischemic in nature. No volume overload at present. Continues with     ARNI, bblocker, statin, Farxiga. Not on a diuretic. She is not interested in AICD. Chronic hypertension (401.9) (I10) BP stable on present rx at home. Suggest continued close home monitoring. Giant cell arteritis (446.5) (M31.6) H/o giant cell. Followed by Rheumatology; on MTX. Hyperlipidemia (272.4) (E78.5) Continue with statin therapy. Myocardial infarct (410.90) (I21.9) H/o MI. No active angina. Continue with secondary prevention. Severe mitral regurgitation (424.0) (I34.0) Being followed by Structural Heart and HF as well. At last check, on medications, MR was     considered moderate (with mild/mod TR). No indication for invasive procedures.     Continue to monitor. Severe tricuspid regurgitation (397.0) (I07.1) At last check, TR has reduced in severity. Continue to monitor. Check echo at next visit. Stage 3a chronic kidney disease (585.3) (N18.31)  Most recent echo July 22, 2024: CONCLUSIONS:  1. The left ventricular cavity is mildly dilated. Left ventricular wall thickness is normal. Left ventricular systolic function is moderately to severely decreased with a calculated ejection fraction of 32 % by the Marin's biplane method of disks. There are regional wall motion abnormalities present. There is mild (grade 1) left ventricular diastolic dysfunction. There is increased LV mass and eccentric hypertrophy. Hypokinesis of the inferior wall, inferolateral wall, basal to mid anterolateral wall, and basal inferoseptum.  2. Normal right ventricular cavity size and normal right ventricular systolic function  3. Structurally normal mitral valve with normal leaflet excursion. There is calcification of the mitral valve annulus. There is moderate mitral regurgitation.  4. The left atrium is severely dilated with an indexed volume of 49.62 ml/m.  5. Compared to the transthoracic echocardiogram performed on 1/17/2023, less mitral regurgitation was noted on the current study. In addition, the estimated pulmonary artery systolic pressure has decreased significantly.  FINDINGS:  Left Ventricle: The left ventricular cavity is mildly dilated. Left ventricular wall thickness is normal. Left ventricular systolic function is moderately to severely decreased with a calculated ejection fraction of 32 % by the Marin's biplane method of disks. There are regional wall motion abnormalities present. There is mild (grade 1) left ventricular diastolic dysfunction. There is increased LV mass and eccentric hypertrophy. Hypokinesis of the inferior wall, inferolateral wall, basal to mid anterolateral wall, and basal inferoseptum.  Right Ventricle: The right ventricular cavity is normal in size and right ventricular systolic function is normal. Tricuspid annular plane systolic excursion (TAPSE) is 1.8 cm (normal >=1.7 cm).  Left Atrium: The left atrium is severely dilated with an indexed volume of 49.62 ml/m.  Right Atrium: The right atrium is normal in size with an indexed volume of 24.40 ml/m and an indexed area of 9.95 cm/m.  Aortic Valve: The aortic valve appears trileaflet with normal systolic excursion. There is calcification of the aortic valve leaflets. There is no evidence of aortic regurgitation.  Mitral Valve: Structurally normal mitral valve with normal leaflet excursion. There is calcification of the mitral valve annulus. There is moderate mitral regurgitation.  Tricuspid Valve: Structurally normal tricuspid valve with normal leaflet excursion. There is mild to moderate tricuspid regurgitation. Estimated pulmonary artery systolic pressure is 30 mmHg, consistent with normal pulmonary artery pressure.  Pulmonic Valve: Structurally normal pulmonic valve with normal leaflet excursion. There is mild pulmonic regurgitation.  Aorta: The aortic root at the sinuses of Valsalva is normal in size, measuring 2.50 cm (indexed 1.69 cm/m). The ascending aorta diameter is normal in size, measuring 2.60 cm (indexed 1.76 cm/m).  Pericardium: No pericardial effusion seen.  Systemic Veins: The inferior vena cava is normal in size measuring 1.13 cm in diameter, (normal <2.1cm) with normal inspiratory collapse (normal >50%) consistent with normal right atrial pressure ( R 3, range 0-5mmHg).  Since her last visit, she has followed up with Heart Failure service (Dr. Garcia) at [facility]. She is no longer seeing Structural Heart Team given her improvement / stability. States she is feeling fine from CV standpoint. Some generalized fatigue and joint aches (chronic, given RA). Minimal use of any diuretic (<once per week). She has (mistakenly) been taking Entresto just once daily. No SOB/SANTAMARIA/PND. No orthopnea, edema. She has had labs done in recent past (not in our computer system).   EKG from July, 2024: Sinus rhythm   Short ME syndrome  Nonspecific T wave abnormality ABNORMAL ECG; unchanged from prior

## 2024-08-26 NOTE — HISTORY OF PRESENT ILLNESS
[FreeTextEntry1] : 80 yo woman presents for CV follow-up of heart failure/valve disease.  Previously followed at [facility] by Alfredito Dong, Cardiology. She is now living in [place] with daughter Mya (cell [phone]).  H/o anemia Elevated ESR, CRP - Giant cell arteritis, diagnosed 2019, presented with partial right eye vision loss HTN HLD Rheumatoid arthritis Pre-diabetes Heart failure H/o MI 2/2021, unable to perform PCI; LVEF 41-45%; no h/o PCI or CABG / revascularization MR, TR; followed by structural clinic and HF  February 2023: - s/p cardiac cath 2/10: s/p POBA to D1, dLM 70%x 1, pLAD 70% x 1 stents. RFA sheath. IABP on LFA to increase coronary perfusion i/s/o large clot burden in LCX and Diag. ASA, Plavix loaded. Heparin started in CICU -Echo 1/17/2023 showed: EF of 29, severe MR, Moderately dilated left atrium. Mild left ventricular enlargement. Severe segmental left ventricular systolic dysfunction. No LV thrombus seen. Moderate diastolic dysfunction (Stage II). Right ventricular enlargement with decreased right ventricular systolic function. severe pulmonary hypertension. Moderate-severe tricuspid regurgitation.  Labs Feb/March 2023: BNP 1305 CMP normal  Hb 9.4, HCL 30% A1c 5.4%  Echo March 2023: LVEF 35% with inf/inf-lateral AK; dilated LV cavity Moderate MR, secondary/functional Mild aortic stenosis Mild/mod TR RVSP 38 mmHg  At her last visit with me in October 2023, the following issues were discussed: CAD in native artery (414.01) (I25.10) H/o MI; s/p PCI during angiogram as part of HF evaluation. Doing well; continue aspirin,     in addition to other medications. No angina. Will d/c clopidogrel at this time. CHF (congestive heart failure) (428.0) (I50.9) Reduced LVEF, likely ischemic in nature. No volume overload at present. Continues with     ARNI, bblocker, statin, Farxiga. Not on a diuretic. She is not interested in AICD. Chronic hypertension (401.9) (I10) BP stable on present rx at home. Suggest continued close home monitoring. Giant cell arteritis (446.5) (M31.6) H/o giant cell. Followed by Rheumatology; on MTX. Hyperlipidemia (272.4) (E78.5) Continue with statin therapy. Myocardial infarct (410.90) (I21.9) H/o MI. No active angina. Continue with secondary prevention. Severe mitral regurgitation (424.0) (I34.0) Being followed by Structural Heart and HF as well. At last check, on medications, MR was     considered moderate (with mild/mod TR). No indication for invasive procedures.     Continue to monitor. Severe tricuspid regurgitation (397.0) (I07.1) At last check, TR has reduced in severity. Continue to monitor. Check echo at next visit. Stage 3a chronic kidney disease (585.3) (N18.31)  Most recent echo July 22, 2024: CONCLUSIONS:  1. The left ventricular cavity is mildly dilated. Left ventricular wall thickness is normal. Left ventricular systolic function is moderately to severely decreased with a calculated ejection fraction of 32 % by the Marin's biplane method of disks. There are regional wall motion abnormalities present. There is mild (grade 1) left ventricular diastolic dysfunction. There is increased LV mass and eccentric hypertrophy. Hypokinesis of the inferior wall, inferolateral wall, basal to mid anterolateral wall, and basal inferoseptum.  2. Normal right ventricular cavity size and normal right ventricular systolic function  3. Structurally normal mitral valve with normal leaflet excursion. There is calcification of the mitral valve annulus. There is moderate mitral regurgitation.  4. The left atrium is severely dilated with an indexed volume of 49.62 ml/m.  5. Compared to the transthoracic echocardiogram performed on 1/17/2023, less mitral regurgitation was noted on the current study. In addition, the estimated pulmonary artery systolic pressure has decreased significantly.  FINDINGS:  Left Ventricle: The left ventricular cavity is mildly dilated. Left ventricular wall thickness is normal. Left ventricular systolic function is moderately to severely decreased with a calculated ejection fraction of 32 % by the Marin's biplane method of disks. There are regional wall motion abnormalities present. There is mild (grade 1) left ventricular diastolic dysfunction. There is increased LV mass and eccentric hypertrophy. Hypokinesis of the inferior wall, inferolateral wall, basal to mid anterolateral wall, and basal inferoseptum.  Right Ventricle: The right ventricular cavity is normal in size and right ventricular systolic function is normal. Tricuspid annular plane systolic excursion (TAPSE) is 1.8 cm (normal >=1.7 cm).  Left Atrium: The left atrium is severely dilated with an indexed volume of 49.62 ml/m.  Right Atrium: The right atrium is normal in size with an indexed volume of 24.40 ml/m and an indexed area of 9.95 cm/m.  Aortic Valve: The aortic valve appears trileaflet with normal systolic excursion. There is calcification of the aortic valve leaflets. There is no evidence of aortic regurgitation.  Mitral Valve: Structurally normal mitral valve with normal leaflet excursion. There is calcification of the mitral valve annulus. There is moderate mitral regurgitation.  Tricuspid Valve: Structurally normal tricuspid valve with normal leaflet excursion. There is mild to moderate tricuspid regurgitation. Estimated pulmonary artery systolic pressure is 30 mmHg, consistent with normal pulmonary artery pressure.  Pulmonic Valve: Structurally normal pulmonic valve with normal leaflet excursion. There is mild pulmonic regurgitation.  Aorta: The aortic root at the sinuses of Valsalva is normal in size, measuring 2.50 cm (indexed 1.69 cm/m). The ascending aorta diameter is normal in size, measuring 2.60 cm (indexed 1.76 cm/m).  Pericardium: No pericardial effusion seen.  Systemic Veins: The inferior vena cava is normal in size measuring 1.13 cm in diameter, (normal <2.1cm) with normal inspiratory collapse (normal >50%) consistent with normal right atrial pressure ( R 3, range 0-5mmHg).  Since her last visit, she has followed up with Heart Failure service (Dr. Garcia) at [facility]. She is no longer seeing Structural Heart Team given her improvement / stability. States she is feeling fine from CV standpoint. Some generalized fatigue and joint aches (chronic, given RA). Minimal use of any diuretic (<once per week). She has (mistakenly) been taking Entresto just once daily. No SOB/SANTAMARIA/PND. No orthopnea, edema. She has had labs done in recent past (not in our computer system).   EKG from July, 2024: Sinus rhythm   Short [place] syndrome  Nonspecific T wave abnormality ABNORMAL ECG; unchanged from prior

## 2024-08-26 NOTE — DISCUSSION/SUMMARY
[EKG obtained to assist in diagnosis and management of assessed problem(s)] : EKG obtained to assist in diagnosis and management of assessed problem(s) [FreeTextEntry1] : Impression:  1.  Chronic systolic CHF: EKG performed today to assess for presence of conduction disease and reveals NSR. NYHA Class II symptoms. Resume OMT as prescribed, encouraged heart healthy diet, daily weight, and regular f/u with Cardiology/Heart failure team as scheduled. Discussed implantation of ICD.  A thorough discussion was had with the patient concerning all aspects of ICD therapy. We reviewed the data supporting ICD therapy and how it applies individually. We discussed the procedures, risks and outcomes of ICD implantation an living with an ICD. We discussed management of ICD therapy throughout life, including deactivation of the ICD. After all questions were answered, and literature was provided, it was a shared decision to discuss procedure at home with family and will call to schedule if she decides to go forward with procedure.   2. HTN: resume oral antihypertensives as prescribed. Encouraged heart healthy diet, sodium restriction, and weight loss. Continue regular f/u with Cardiologist for further HTN management.  3. HLD: resume statin therapy as prescribed and regular f/u with Cardiologist for routine lipid monitoring and management.  Continue f/u with Cardiologist and RTO for f/u in 6 months.

## 2024-08-26 NOTE — PHYSICAL EXAM
[Well Nourished] : well nourished [No Acute Distress] : no acute distress [Frail] : frail [Normal Conjunctiva] : normal conjunctiva [Normal Venous Pressure] : normal venous pressure [No Carotid Bruit] : no carotid bruit [Normal S1, S2] : normal S1, S2 [No Rub] : no rub [No Gallop] : no gallop [Clear Lung Fields] : clear lung fields [Good Air Entry] : good air entry [No Respiratory Distress] : no respiratory distress  [Soft] : abdomen soft [Non Tender] : non-tender [No Masses/organomegaly] : no masses/organomegaly [Normal Bowel Sounds] : normal bowel sounds [Normal Gait] : normal gait [No Edema] : no edema [No Cyanosis] : no cyanosis [No Clubbing] : no clubbing [No Varicosities] : no varicosities [No Rash] : no rash [No Skin Lesions] : no skin lesions [Moves all extremities] : moves all extremities [No Focal Deficits] : no focal deficits [Normal Speech] : normal speech [Alert and Oriented] : alert and oriented [Normal memory] : normal memory [de-identified] : appears well [de-identified] : soft systolic murmur at LUSB [de-identified] : chronic RA, hands, bilateral

## 2024-08-26 NOTE — PHYSICAL EXAM
[Well Nourished] : well nourished [No Acute Distress] : no acute distress [Frail] : frail [Normal Conjunctiva] : normal conjunctiva [Normal Venous Pressure] : normal venous pressure [No Carotid Bruit] : no carotid bruit [Normal S1, S2] : normal S1, S2 [No Rub] : no rub [No Gallop] : no gallop [Clear Lung Fields] : clear lung fields [Good Air Entry] : good air entry [No Respiratory Distress] : no respiratory distress  [Soft] : abdomen soft [Non Tender] : non-tender [No Masses/organomegaly] : no masses/organomegaly [Normal Bowel Sounds] : normal bowel sounds [Normal Gait] : normal gait [No Edema] : no edema [No Cyanosis] : no cyanosis [No Clubbing] : no clubbing [No Varicosities] : no varicosities [No Rash] : no rash [No Skin Lesions] : no skin lesions [Moves all extremities] : moves all extremities [No Focal Deficits] : no focal deficits [Normal Speech] : normal speech [Alert and Oriented] : alert and oriented [Normal memory] : normal memory [de-identified] : appears well [de-identified] : soft systolic murmur at LUSB [de-identified] : chronic RA, hands, bilateral

## 2024-08-26 NOTE — CARDIOLOGY SUMMARY
[de-identified] : 7/22/24 NSR 70, NSST [de-identified] : CONCLUSIONS:   1. The left ventricular cavity is mildly dilated. Left ventricular wall thickness is normal. Left ventricular systolic function is moderately to severely decreased with a calculated ejection fraction of 32 % by the Marin's biplane method of disks. There are regional wall motion abnormalities present. There is mild (grade 1) left ventricular diastolic dysfunction. There is increased LV mass and eccentric hypertrophy. Hypokinesis of the inferior wall, inferolateral wall, basal to mid anterolateral wall, and basal inferoseptum.  2. Normal right ventricular cavity size and normal right ventricular systolic function  3. Structurally normal mitral valve with normal leaflet excursion. There is calcification of the mitral valve annulus. There is moderate mitral regurgitation.  4. The left atrium is severely dilated with an indexed volume of 49.62 ml/m.  5. Compared to the transthoracic echocardiogram performed on 1/17/2023, less mitral regurgitation was noted on the current study. In addition, the estimated pulmonary artery systolic pressure has decreased significantly.

## 2024-08-26 NOTE — REVIEW OF SYSTEMS
[Feeling Fatigued] : feeling fatigued [Joint Pain] : joint pain [Negative] : Heme/Lymph [Fever] : no fever [Chills] : no chills [SOB] : no shortness of breath [Dyspnea on exertion] : not dyspnea during exertion [Chest Discomfort] : no chest discomfort [Lower Ext Edema] : no extremity edema [Leg Claudication] : no intermittent leg claudication [Palpitations] : no palpitations [Orthopnea] : no orthopnea [PND] : no PND [Syncope] : no syncope [Wheezing] : no wheezing [Coughing Up Blood] : no hemoptysis

## 2024-08-26 NOTE — CARDIOLOGY SUMMARY
[de-identified] : 7/22/24 NSR 70, NSST [de-identified] : CONCLUSIONS:   1. The left ventricular cavity is mildly dilated. Left ventricular wall thickness is normal. Left ventricular systolic function is moderately to severely decreased with a calculated ejection fraction of 32 % by the Marin's biplane method of disks. There are regional wall motion abnormalities present. There is mild (grade 1) left ventricular diastolic dysfunction. There is increased LV mass and eccentric hypertrophy. Hypokinesis of the inferior wall, inferolateral wall, basal to mid anterolateral wall, and basal inferoseptum.  2. Normal right ventricular cavity size and normal right ventricular systolic function  3. Structurally normal mitral valve with normal leaflet excursion. There is calcification of the mitral valve annulus. There is moderate mitral regurgitation.  4. The left atrium is severely dilated with an indexed volume of 49.62 ml/m.  5. Compared to the transthoracic echocardiogram performed on 1/17/2023, less mitral regurgitation was noted on the current study. In addition, the estimated pulmonary artery systolic pressure has decreased significantly.

## 2024-08-27 LAB
ALBUMIN SERPL ELPH-MCNC: 4.8 G/DL
ALP BLD-CCNC: 86 U/L
ALT SERPL-CCNC: 26 U/L
ANION GAP SERPL CALC-SCNC: 15 MMOL/L
AST SERPL-CCNC: 29 U/L
BILIRUB SERPL-MCNC: 0.4 MG/DL
BUN SERPL-MCNC: 32 MG/DL
CALCIUM SERPL-MCNC: 9.2 MG/DL
CHLORIDE SERPL-SCNC: 100 MMOL/L
CHOLEST SERPL-MCNC: 151 MG/DL
CO2 SERPL-SCNC: 23 MMOL/L
CREAT SERPL-MCNC: 1.05 MG/DL
EGFR: 53 ML/MIN/1.73M2
ESTIMATED AVERAGE GLUCOSE: 105 MG/DL
GLUCOSE SERPL-MCNC: 61 MG/DL
HBA1C MFR BLD HPLC: 5.3 %
HCT VFR BLD CALC: 35.3 %
HDLC SERPL-MCNC: 57 MG/DL
HGB BLD-MCNC: 11.4 G/DL
LDLC SERPL CALC-MCNC: 77 MG/DL
MCHC RBC-ENTMCNC: 31.8 PG
MCHC RBC-ENTMCNC: 32.3 GM/DL
MCV RBC AUTO: 98.3 FL
NONHDLC SERPL-MCNC: 94 MG/DL
PLATELET # BLD AUTO: 226 K/UL
POTASSIUM SERPL-SCNC: 4.5 MMOL/L
PROT SERPL-MCNC: 7.5 G/DL
RBC # BLD: 3.59 M/UL
RBC # FLD: 12.5 %
SODIUM SERPL-SCNC: 138 MMOL/L
TRIGL SERPL-MCNC: 90 MG/DL
TSH SERPL-ACNC: 2.05 UIU/ML
WBC # FLD AUTO: 4.49 K/UL

## 2024-11-07 ENCOUNTER — RX RENEWAL (OUTPATIENT)
Age: 79
End: 2024-11-07

## 2024-11-14 ENCOUNTER — APPOINTMENT (OUTPATIENT)
Dept: RHEUMATOLOGY | Facility: CLINIC | Age: 79
End: 2024-11-14
Payer: MEDICARE

## 2024-11-14 VITALS
HEIGHT: 59 IN | SYSTOLIC BLOOD PRESSURE: 129 MMHG | OXYGEN SATURATION: 99 % | HEART RATE: 63 BPM | WEIGHT: 116 LBS | BODY MASS INDEX: 23.39 KG/M2 | DIASTOLIC BLOOD PRESSURE: 74 MMHG

## 2024-11-14 DIAGNOSIS — M81.0 AGE-RELATED OSTEOPOROSIS W/OUT CURRENT PATHOLOGICAL FRACTURE: ICD-10-CM

## 2024-11-14 DIAGNOSIS — M17.0 BILATERAL PRIMARY OSTEOARTHRITIS OF KNEE: ICD-10-CM

## 2024-11-14 DIAGNOSIS — M06.9 RHEUMATOID ARTHRITIS, UNSPECIFIED: ICD-10-CM

## 2024-11-14 PROCEDURE — 99214 OFFICE O/P EST MOD 30 MIN: CPT | Mod: 25

## 2024-11-14 PROCEDURE — 20610 DRAIN/INJ JOINT/BURSA W/O US: CPT | Mod: 50

## 2024-11-14 RX ORDER — FAMOTIDINE 40 MG/1
40 TABLET, FILM COATED ORAL
Qty: 30 | Refills: 1 | Status: ACTIVE | COMMUNITY
Start: 2024-11-14

## 2024-11-14 RX ORDER — METHYLPRED ACET/NACL,ISO-OS/PF 40 MG/ML
40 VIAL (ML) INJECTION
Qty: 0 | Refills: 0 | Status: COMPLETED | OUTPATIENT
Start: 2024-11-14

## 2024-11-14 RX ADMIN — METHYLPREDNISOLONE ACETATE MG/ML: 40 INJECTION, SUSPENSION INTRA-ARTICULAR; INTRALESIONAL; INTRAMUSCULAR; SOFT TISSUE at 00:00

## 2024-11-18 ENCOUNTER — APPOINTMENT (OUTPATIENT)
Dept: CARDIOLOGY | Facility: CLINIC | Age: 79
End: 2024-11-18
Payer: MEDICARE

## 2024-11-18 ENCOUNTER — NON-APPOINTMENT (OUTPATIENT)
Age: 79
End: 2024-11-18

## 2024-11-18 VITALS
SYSTOLIC BLOOD PRESSURE: 130 MMHG | HEIGHT: 59 IN | DIASTOLIC BLOOD PRESSURE: 63 MMHG | WEIGHT: 113 LBS | HEART RATE: 77 BPM | BODY MASS INDEX: 22.78 KG/M2 | OXYGEN SATURATION: 100 %

## 2024-11-18 DIAGNOSIS — N18.31 CHRONIC KIDNEY DISEASE, STAGE 3A: ICD-10-CM

## 2024-11-18 DIAGNOSIS — I25.10 ATHEROSCLEROTIC HEART DISEASE OF NATIVE CORONARY ARTERY W/OUT ANGINA PECTORIS: ICD-10-CM

## 2024-11-18 DIAGNOSIS — M31.6 OTHER GIANT CELL ARTERITIS: ICD-10-CM

## 2024-11-18 DIAGNOSIS — I50.9 HEART FAILURE, UNSPECIFIED: ICD-10-CM

## 2024-11-18 DIAGNOSIS — I10 ESSENTIAL (PRIMARY) HYPERTENSION: ICD-10-CM

## 2024-11-18 DIAGNOSIS — I34.0 NONRHEUMATIC MITRAL (VALVE) INSUFFICIENCY: ICD-10-CM

## 2024-11-18 DIAGNOSIS — E78.5 HYPERLIPIDEMIA, UNSPECIFIED: ICD-10-CM

## 2024-11-18 DIAGNOSIS — R73.03 PREDIABETES.: ICD-10-CM

## 2024-11-18 PROCEDURE — G0404: CPT

## 2024-11-18 PROCEDURE — 99213 OFFICE O/P EST LOW 20 MIN: CPT | Mod: 25

## 2025-04-09 ENCOUNTER — RX RENEWAL (OUTPATIENT)
Age: 80
End: 2025-04-09

## 2025-04-09 RX ORDER — DAPAGLIFLOZIN 5 MG/1
5 TABLET, FILM COATED ORAL
Qty: 30 | Refills: 5 | Status: ACTIVE | COMMUNITY
Start: 2025-04-09 | End: 1900-01-01

## 2025-05-19 ENCOUNTER — NON-APPOINTMENT (OUTPATIENT)
Age: 80
End: 2025-05-19

## 2025-05-19 ENCOUNTER — APPOINTMENT (OUTPATIENT)
Dept: CARDIOLOGY | Facility: CLINIC | Age: 80
End: 2025-05-19
Payer: MEDICARE

## 2025-05-19 VITALS
DIASTOLIC BLOOD PRESSURE: 69 MMHG | BODY MASS INDEX: 23.39 KG/M2 | WEIGHT: 116 LBS | HEIGHT: 59 IN | OXYGEN SATURATION: 98 % | SYSTOLIC BLOOD PRESSURE: 129 MMHG | HEART RATE: 66 BPM

## 2025-05-19 DIAGNOSIS — E78.5 HYPERLIPIDEMIA, UNSPECIFIED: ICD-10-CM

## 2025-05-19 DIAGNOSIS — M31.6 OTHER GIANT CELL ARTERITIS: ICD-10-CM

## 2025-05-19 DIAGNOSIS — I34.0 NONRHEUMATIC MITRAL (VALVE) INSUFFICIENCY: ICD-10-CM

## 2025-05-19 DIAGNOSIS — I10 ESSENTIAL (PRIMARY) HYPERTENSION: ICD-10-CM

## 2025-05-19 DIAGNOSIS — I07.1 RHEUMATIC TRICUSPID INSUFFICIENCY: ICD-10-CM

## 2025-05-19 DIAGNOSIS — I50.9 HEART FAILURE, UNSPECIFIED: ICD-10-CM

## 2025-05-19 DIAGNOSIS — R73.03 PREDIABETES.: ICD-10-CM

## 2025-05-19 DIAGNOSIS — N18.31 CHRONIC KIDNEY DISEASE, STAGE 3A: ICD-10-CM

## 2025-05-19 DIAGNOSIS — I25.10 ATHEROSCLEROTIC HEART DISEASE OF NATIVE CORONARY ARTERY W/OUT ANGINA PECTORIS: ICD-10-CM

## 2025-05-19 PROCEDURE — 99213 OFFICE O/P EST LOW 20 MIN: CPT | Mod: 25

## 2025-05-19 PROCEDURE — 93000 ELECTROCARDIOGRAM COMPLETE: CPT

## 2025-05-22 ENCOUNTER — APPOINTMENT (OUTPATIENT)
Dept: RHEUMATOLOGY | Facility: CLINIC | Age: 80
End: 2025-05-22

## 2025-05-22 VITALS
OXYGEN SATURATION: 96 % | HEIGHT: 59 IN | BODY MASS INDEX: 22.58 KG/M2 | DIASTOLIC BLOOD PRESSURE: 75 MMHG | RESPIRATION RATE: 16 BRPM | HEART RATE: 70 BPM | SYSTOLIC BLOOD PRESSURE: 137 MMHG | WEIGHT: 112 LBS

## 2025-05-22 DIAGNOSIS — M06.9 RHEUMATOID ARTHRITIS, UNSPECIFIED: ICD-10-CM

## 2025-05-22 DIAGNOSIS — M17.0 BILATERAL PRIMARY OSTEOARTHRITIS OF KNEE: ICD-10-CM

## 2025-05-22 DIAGNOSIS — M81.0 AGE-RELATED OSTEOPOROSIS W/OUT CURRENT PATHOLOGICAL FRACTURE: ICD-10-CM

## 2025-05-22 PROCEDURE — 20610 DRAIN/INJ JOINT/BURSA W/O US: CPT | Mod: 50

## 2025-05-22 PROCEDURE — 99214 OFFICE O/P EST MOD 30 MIN: CPT | Mod: 25

## 2025-05-22 RX ORDER — METHYLPRED ACET/NACL,ISO-OS/PF 40 MG/ML
40 VIAL (ML) INJECTION
Refills: 0 | Status: COMPLETED | OUTPATIENT
Start: 2025-05-22

## 2025-05-22 RX ADMIN — METHYLPREDNISOLONE ACETATE MG/ML: 40 INJECTION, SUSPENSION INTRA-ARTICULAR; INTRALESIONAL; INTRAMUSCULAR; SOFT TISSUE at 00:00

## 2025-08-22 ENCOUNTER — RX RENEWAL (OUTPATIENT)
Age: 80
End: 2025-08-22

## 2025-08-28 ENCOUNTER — RX RENEWAL (OUTPATIENT)
Age: 80
End: 2025-08-28